# Patient Record
Sex: MALE | Race: WHITE | NOT HISPANIC OR LATINO | URBAN - METROPOLITAN AREA
[De-identification: names, ages, dates, MRNs, and addresses within clinical notes are randomized per-mention and may not be internally consistent; named-entity substitution may affect disease eponyms.]

---

## 2017-01-09 VITALS
DIASTOLIC BLOOD PRESSURE: 89 MMHG | HEART RATE: 79 BPM | SYSTOLIC BLOOD PRESSURE: 169 MMHG | OXYGEN SATURATION: 99 % | HEIGHT: 69 IN | WEIGHT: 162.04 LBS | RESPIRATION RATE: 18 BRPM

## 2017-01-09 RX ORDER — CHLORHEXIDINE GLUCONATE 213 G/1000ML
1 SOLUTION TOPICAL ONCE
Qty: 0 | Refills: 0 | Status: DISCONTINUED | OUTPATIENT
Start: 2017-01-11 | End: 2017-01-12

## 2017-01-09 NOTE — H&P ADULT. - FAMILY HISTORY
Father  Still living? Unknown  Family history of coronary artery disease, Age at diagnosis: Age Unknown

## 2017-01-09 NOTE — H&P ADULT. - ASSESSMENT
Patient is a 70yo M with FHx CAD (father CABG at 49yo) and PMHx of HTN, HLD, Non-Hodgkin's lymphoma (dx 1996 and tx'd with chemotherapy, in remission) who was found with an abnormal  ECHO and NST and is now recommended for recommended cardiac catheterization with possible intervention.    ASA II, Mallampati II Patient is a 72yo M with FHx CAD (father CABG at 51yo) and PMHx of HTN, HLD, Non-Hodgkin's lymphoma (dx 1996 and tx'd with chemotherapy, in remission) who was found with an abnormal  ECHO and NST and is now recommended for recommended cardiac catheterization with possible intervention.    ASA II, Mallampati II

## 2017-01-09 NOTE — H&P ADULT. - HISTORY OF PRESENT ILLNESS
[place]     Patient is a 70yo M with FHx CAD (father CABG at 51yo) and PMHx of HTN, HLD, Non-Hodgkin's lymphoma (dx 1996 and tx'd with chemotherapy, in remission) who was out finishing and had an episode of transient global amnesia for which he presented to [facility] in . Full workup was done at that time per patient and CT head, EEG Patient is a 70yo M with FHx CAD (father CABG at 51yo) and PMHx of HTN, HLD, Non-Hodgkin's lymphoma (dx 1996 and tx'd with chemotherapy, in remission) who was out finishing and had an episode of transient global amnesia 10/2016 (could not remember where he was or why he was there) while fishing for which he presented to [facility] in . Full workup was done at that time per patient and CT head, EEG and holter monitor were all negative. Per patient, echocardiogram was abnormal and Dr. Teague referred him for NST. NST performed on 11/17/16 revealed normal EKG, medium sized area of inferior wall myocardial infarction, normal LV function with EF 69%. Patient denies faitgue, CP, palpitations, SOB, PND, orthopnea, N/V, diaphoresis, hematochezia, melena, LE edema/ulcerations, dizziness, syncope. In light of patient's risk factors and abnormal NST, patient is now referred to [facility] for recommended cardiac catheterization with possible intervention.

## 2017-01-11 ENCOUNTER — INPATIENT (INPATIENT)
Facility: HOSPITAL | Age: 72
LOS: 0 days | Discharge: HOME | DRG: 247 | End: 2017-01-12
Attending: INTERNAL MEDICINE | Admitting: INTERNAL MEDICINE
Payer: MEDICARE

## 2017-01-11 DIAGNOSIS — Z90.89 ACQUIRED ABSENCE OF OTHER ORGANS: Chronic | ICD-10-CM

## 2017-01-11 LAB
ALBUMIN SERPL ELPH-MCNC: 4 G/DL — SIGNIFICANT CHANGE UP (ref 3.4–5)
ALP SERPL-CCNC: 83 U/L — SIGNIFICANT CHANGE UP (ref 40–120)
ALT FLD-CCNC: 37 U/L — SIGNIFICANT CHANGE UP (ref 12–42)
ANION GAP SERPL CALC-SCNC: 6 MMOL/L — LOW (ref 9–16)
APTT BLD: 30.4 SEC — SIGNIFICANT CHANGE UP (ref 27.5–37.4)
AST SERPL-CCNC: 34 U/L — SIGNIFICANT CHANGE UP (ref 15–37)
BASOPHILS NFR BLD AUTO: 0.4 % — SIGNIFICANT CHANGE UP (ref 0–2)
BILIRUB SERPL-MCNC: 0.5 MG/DL — SIGNIFICANT CHANGE UP (ref 0.2–1.2)
BUN SERPL-MCNC: 16 MG/DL — SIGNIFICANT CHANGE UP (ref 7–23)
CALCIUM SERPL-MCNC: 9.5 MG/DL — SIGNIFICANT CHANGE UP (ref 8.5–10.5)
CHLORIDE SERPL-SCNC: 104 MMOL/L — SIGNIFICANT CHANGE UP (ref 96–108)
CHOLEST SERPL-MCNC: 181 MG/DL — SIGNIFICANT CHANGE UP
CK MB BLD-MCNC: 2.5 % — SIGNIFICANT CHANGE UP
CK MB CFR SERPL CALC: 10.5 NG/ML — HIGH (ref 0.5–3.6)
CO2 SERPL-SCNC: 30 MMOL/L — SIGNIFICANT CHANGE UP (ref 22–31)
CREAT SERPL-MCNC: 1.15 MG/DL — SIGNIFICANT CHANGE UP (ref 0.5–1.3)
CRP SERPL-MCNC: <0.29 MG/DL — SIGNIFICANT CHANGE UP
EOSINOPHIL NFR BLD AUTO: 2.2 % — SIGNIFICANT CHANGE UP (ref 0–6)
ERYTHROCYTE [SEDIMENTATION RATE] IN BLOOD: 7 MM/HR — SIGNIFICANT CHANGE UP
GLUCOSE SERPL-MCNC: 112 MG/DL — HIGH (ref 70–99)
HBA1C BLD-MCNC: 5.7 % — HIGH (ref 4.8–5.6)
HCT VFR BLD CALC: 42.3 % — SIGNIFICANT CHANGE UP (ref 39–50)
HDLC SERPL-MCNC: 44 MG/DL — SIGNIFICANT CHANGE UP
HGB BLD-MCNC: 14 G/DL — SIGNIFICANT CHANGE UP (ref 13–17)
INR BLD: 1.09 — SIGNIFICANT CHANGE UP (ref 0.88–1.16)
LIPID PNL WITH DIRECT LDL SERPL: 105 MG/DL — HIGH
LYMPHOCYTES # BLD AUTO: 22.4 % — SIGNIFICANT CHANGE UP (ref 13–44)
MCHC RBC-ENTMCNC: 30.1 PG — SIGNIFICANT CHANGE UP (ref 27–34)
MCHC RBC-ENTMCNC: 33.1 G/DL — SIGNIFICANT CHANGE UP (ref 32–36)
MCV RBC AUTO: 91 FL — SIGNIFICANT CHANGE UP (ref 80–100)
MONOCYTES NFR BLD AUTO: 13.2 % — SIGNIFICANT CHANGE UP (ref 2–14)
NEUTROPHILS NFR BLD AUTO: 61.8 % — SIGNIFICANT CHANGE UP (ref 43–77)
PLATELET # BLD AUTO: 171 K/UL — SIGNIFICANT CHANGE UP (ref 150–400)
POTASSIUM SERPL-MCNC: 3.9 MMOL/L — SIGNIFICANT CHANGE UP (ref 3.5–5.3)
POTASSIUM SERPL-SCNC: 3.9 MMOL/L — SIGNIFICANT CHANGE UP (ref 3.5–5.3)
PROT SERPL-MCNC: 7.7 G/DL — SIGNIFICANT CHANGE UP (ref 6.4–8.2)
PROTHROM AB SERPL-ACNC: 12.1 SEC — SIGNIFICANT CHANGE UP (ref 10–13.1)
RBC # BLD: 4.65 M/UL — SIGNIFICANT CHANGE UP (ref 4.2–5.8)
RBC # FLD: 12.8 % — SIGNIFICANT CHANGE UP (ref 10.3–16.9)
SODIUM SERPL-SCNC: 140 MMOL/L — SIGNIFICANT CHANGE UP (ref 135–145)
TOTAL CHOLESTEROL/HDL RATIO MEASUREMENT: 4.1 RATIO — SIGNIFICANT CHANGE UP
TRIGL SERPL-MCNC: 159 MG/DL — HIGH
WBC # BLD: 7.4 K/UL — SIGNIFICANT CHANGE UP (ref 3.8–10.5)
WBC # FLD AUTO: 7.4 K/UL — SIGNIFICANT CHANGE UP (ref 3.8–10.5)

## 2017-01-11 PROCEDURE — 92928 PRQ TCAT PLMT NTRAC ST 1 LES: CPT | Mod: RC

## 2017-01-11 PROCEDURE — 93458 L HRT ARTERY/VENTRICLE ANGIO: CPT | Mod: 26,XU

## 2017-01-11 PROCEDURE — 93010 ELECTROCARDIOGRAM REPORT: CPT

## 2017-01-11 PROCEDURE — 99222 1ST HOSP IP/OBS MODERATE 55: CPT

## 2017-01-11 RX ORDER — LISINOPRIL 2.5 MG/1
40 TABLET ORAL DAILY
Qty: 0 | Refills: 0 | Status: DISCONTINUED | OUTPATIENT
Start: 2017-01-11 | End: 2017-01-12

## 2017-01-11 RX ORDER — METOPROLOL TARTRATE 50 MG
50 TABLET ORAL DAILY
Qty: 0 | Refills: 0 | Status: DISCONTINUED | OUTPATIENT
Start: 2017-01-11 | End: 2017-01-12

## 2017-01-11 RX ORDER — SIMVASTATIN 20 MG/1
20 TABLET, FILM COATED ORAL AT BEDTIME
Qty: 0 | Refills: 0 | Status: DISCONTINUED | OUTPATIENT
Start: 2017-01-11 | End: 2017-01-12

## 2017-01-11 RX ORDER — CLOPIDOGREL BISULFATE 75 MG/1
600 TABLET, FILM COATED ORAL ONCE
Qty: 0 | Refills: 0 | Status: COMPLETED | OUTPATIENT
Start: 2017-01-11 | End: 2017-01-11

## 2017-01-11 RX ORDER — SODIUM CHLORIDE 9 MG/ML
500 INJECTION INTRAMUSCULAR; INTRAVENOUS; SUBCUTANEOUS
Qty: 0 | Refills: 0 | Status: DISCONTINUED | OUTPATIENT
Start: 2017-01-11 | End: 2017-01-11

## 2017-01-11 RX ORDER — ASPIRIN/CALCIUM CARB/MAGNESIUM 324 MG
81 TABLET ORAL DAILY
Qty: 0 | Refills: 0 | Status: DISCONTINUED | OUTPATIENT
Start: 2017-01-11 | End: 2017-01-12

## 2017-01-11 RX ORDER — ASPIRIN/CALCIUM CARB/MAGNESIUM 324 MG
325 TABLET ORAL ONCE
Qty: 0 | Refills: 0 | Status: COMPLETED | OUTPATIENT
Start: 2017-01-11 | End: 2017-01-11

## 2017-01-11 RX ORDER — SODIUM CHLORIDE 9 MG/ML
500 INJECTION INTRAMUSCULAR; INTRAVENOUS; SUBCUTANEOUS
Qty: 0 | Refills: 0 | Status: DISCONTINUED | OUTPATIENT
Start: 2017-01-11 | End: 2017-01-12

## 2017-01-11 RX ORDER — CLOPIDOGREL BISULFATE 75 MG/1
75 TABLET, FILM COATED ORAL DAILY
Qty: 0 | Refills: 0 | Status: DISCONTINUED | OUTPATIENT
Start: 2017-01-12 | End: 2017-01-12

## 2017-01-11 RX ADMIN — CLOPIDOGREL BISULFATE 600 MILLIGRAM(S): 75 TABLET, FILM COATED ORAL at 10:44

## 2017-01-11 RX ADMIN — SODIUM CHLORIDE 75 MILLILITER(S): 9 INJECTION INTRAMUSCULAR; INTRAVENOUS; SUBCUTANEOUS at 10:44

## 2017-01-11 RX ADMIN — SIMVASTATIN 20 MILLIGRAM(S): 20 TABLET, FILM COATED ORAL at 21:11

## 2017-01-11 RX ADMIN — Medication 325 MILLIGRAM(S): at 10:43

## 2017-01-11 NOTE — CONSULT NOTE ADULT - SUBJECTIVE AND OBJECTIVE BOX
CHIEF COMPLAINT: CAD    HISTORY OF PRESENT ILLNESS:  Patient is a 72yo M with FHx CAD (father CABG at 49yo) and PMHx of HTN, HLD, Non-Hodgkin's lymphoma (dx 1996 and tx'd with chemotherapy, in remission) who was out finishing and had an episode of transient global amnesia 10/2016 (could not remember where he was or why he was there) while fishing for which he presented to [facility] in . Full workup was done at that time per patient and CT head, EEG and holter monitor were all negative. Per patient, echocardiogram was abnormal and Dr. Teague referred him for NST. NST performed on 11/17/16 revealed normal EKG, medium sized area of inferior wall myocardial infarction, normal LV function with EF 69%. Patient denies faitgue, CP, palpitations, SOB, PND, orthopnea, N/V, diaphoresis, hematochezia, melena, LE edema/ulcerations, dizziness, syncope. In light of patient's risk factors and abnormal NST, patient is now referred to [facility] for recommended cardiac catheterization. He had a PTCA/HUA placed to his mRCA on 1/11/17. He will return in 4-6 weeks for a staged procedure to his LAD.     PAST MEDICAL & SURGICAL HISTORY:  Transient global amnesia  Non-Hodgkin lymphoma  HLD (hyperlipidemia)  HTN (hypertension)  History of tonsillectomy    FAMILY HISTORY:   Family history of coronary artery disease (Father)    ALLERGIES: NKDA    HOME MEDICATIONS:  · 	lisinopril 40 mg oral tablet: Last Dose Taken:  , 1 tab(s) orally once a day  · 	metoprolol tartrate 50 mg oral tablet: Last Dose Taken:  , 1 tab(s) orally once a day  · 	simvastatin 20 mg oral tablet: Last Dose Taken:  , 1 tab(s) orally once a day (at bedtime)  · 	aspirin 81 mg oral tablet: Last Dose Taken:  , 1 tab(s) orally once a day        REVIEW OF SYSTEMS:  CONSTITUTIONAL: No fever, weight loss, or fatigue  NEUROLOGICAL: No headaches, memory loss, loss of strength, numbness, or tremors  PSYCHIATRIC: No depression, anxiety, mood swings, or difficulty sleeping  RESPIRATORY: No cough, wheezing, chills or hemoptysis; No Shortness of Breath  CARDIOVASCULAR: No chest pain, palpitations, passing out, dizziness, or leg swelling  GASTROINTESTINAL: No abdominal or epigastric pain. No nausea, vomiting, or hematemesis; No diarrhea or constipation. No melena or hematochezia.  SKIN: No itching, burning, rashes, or lesions   MUSCULOSKELETAL: No joint pain or swelling; No muscle, back, or extremity pain	    PHYSICAL EXAM:  T(C): 36.7, Max: 36.7 (01-11 @ 14:12)  T(F): 98.1, Max: 98.1 (01-11 @ 14:12)  HR: 84 (72 - 84)  BP: 148/80 (148/80 - 150/77)  RR: 18 (18 - 18)  SpO2: 98% (98% - 99%)  Height (cm): 175.3 (01-11 @ 09:21)  Weight (kg): 73.5 (01-11 @ 09:21)  BMI (kg/m2): 23.9 (01-11 @ 09:21)    Appearance: Normal	  Neurologic: Non-focal  Psychiatric: AxOx3, normal mood and affect  HEENT:   Normal oral mucosa, PERRL, EOMI	  Lymphatic: No lymphadenopathy  Cardiovascular: Normal S1 S2, No JVD, No murmurs, No edema  Respiratory: Lungs clear to auscultation	  Gastrointestinal:  Soft, Non-tender, + BS	  Skin: No rashes, No ecchymoses, No cyanosis	  Extremities: Normal range of motion, No clubbing, cyanosis or edema  Vascular: Peripheral pulses palpable 2+ bilaterally  	  LABS:	               14.0   7.4   )-----------( 171      ( 11 Jan 2017 09:27 )             42.3     11 Jan 2017 09:27    140    |  104    |  16     ----------------------------<  112    3.9     |  30     |  1.15     Ca    9.5        11 Jan 2017 09:27    TPro  7.7    /  Alb  4.0    /  TBili  0.5    /  DBili  x      /  AST  34     /  ALT  37     /  AlkPhos  83     11 Jan 2017 09:27      Hemoglobin A1C, Whole Blood: 5.7 % (01-11 @ 09:27)      Cholesterol, Serum: 181 mg/dL (01-11 @ 09:27)  HDL Cholesterol, Serum: 44 mg/dL (01-11 @ 09:27)  Triglycerides, Serum: 159 mg/dL (01-11 @ 09:27)  Direct LDL: 105 mg/dL (01-11 @ 09:27)    C-Reactive Protein, Serum: <0.29 mg/dL (01-11 @ 09:27)      10 "S" ASSESSMENT PLAN: SMOKING, SITTING, SUGAR, SALT, SOME FATS, SOCIAL, SLEEP, SIGNS, AND MEDS:  Tobacco usage: Remote history of cigar/pipe smoking.   Stress: Rates his stress level as low and well-controlled since being retired.   ETOH: Denies.   Caffeine: Denies.   Hormone Replacement: Denies.   Sleep Disorder: No loud snoring, wakes feeling rested.   Inflammatory Condition: Denies.   Activity Level: Swimming 1-2 times weekly.   Current Diet: Breakfast) white toast with butter and jelly and a bowl of Cheerios with whole milk. Lunch) PB&J on white bread of a cheese sandwich and a soda. Dinner) Soda with chicken or turkey and a starch and mixed vegetables. Other) cookies before bed.   Heart Failure: Denies.   Myopathy with Statins: Denies.   GI/ Issues: Denies.     ASSESSMENT/RECOMMENDATIONS: 	  Summary: Patient is a 72yo M with FHx CAD (father CABG at 49yo) and PMHx of HTN, HLD, Non-Hodgkin's lymphoma (dx 1996 and tx'd with chemotherapy, in remission) who was out finishing and had an episode of transient global amnesia 10/2016 (could not remember where he was or why he was there) while fishing for which he presented to [facility] in . Full workup was done at that time per patient and CT head, EEG and holter monitor were all negative. Per patient, echocardiogram was abnormal and Dr. Teague referred him for NST. NST performed on 11/17/16 revealed normal EKG, medium sized area of inferior wall myocardial infarction, normal LV function with EF 69%. Patient denies faitgue, CP, palpitations, SOB, PND, orthopnea, N/V, diaphoresis, hematochezia, melena, LE edema/ulcerations, dizziness, syncope. In light of patient's risk factors and abnormal NST, patient is now referred to [facility] for recommended cardiac catheterization. He had a PTCA/HUA placed to his mRCA on 1/11/17. He will return in 4-6 weeks for a staged procedure to his LAD.     RECOMMENDATIONS:   Anti-platelet Therapy: DAPT per interventionalist recommendation.   Lipid Therapy: Consider high dose statin therapy with Lipitor 40-80 mg/d or Crestor 20-40 mg/d.   Beta Blocker Therapy: Continue current therapy per your discretion.   ACE/ARB Therapy: Continue current therapy per your discretion.   Diet: Low-sodium, low-carbohydrate, Mediterranean diet. Written instruction on the Mediterranean diet was provided.   Exercise: 30-45 minutes most days of the week once cleared to do so by their referring cardiologist.   Smoking: This patient is a non-smoker.   Stress Management: Instruction on mindfulness meditation was provided.   Sleep: Clinical evidence does not support the need for a sleep study at this time.     Thank you for the opportunity to see this patient. Please feel free to contact [facility] if there are any questions, or if you feel that your patient would benefit from continued follow-up visits with the Program.

## 2017-01-11 NOTE — PROGRESS NOTE ADULT - SUBJECTIVE AND OBJECTIVE BOX
Procedure: LHC, HUA of RCA, [facility]  Indication: NSTEMI, CAD  Complication: none    Result:  1) 2 v CAD (80% mLAD, 90% mRCA)  2) Nl LVF with inferobasal hypokinesis EF 55%, LVEDP 8  3) Successful HUA of mRCA    Plan: Admit for IV hydration as GFR 60. Plavix + ASA x 1 year. Return in 4-6 weeks for PCI of LAD.  To f/u Dr. Teague.

## 2017-01-12 VITALS
OXYGEN SATURATION: 97 % | HEART RATE: 72 BPM | DIASTOLIC BLOOD PRESSURE: 81 MMHG | SYSTOLIC BLOOD PRESSURE: 144 MMHG | RESPIRATION RATE: 15 BRPM

## 2017-01-12 LAB
ANION GAP SERPL CALC-SCNC: 9 MMOL/L — SIGNIFICANT CHANGE UP (ref 9–16)
BUN SERPL-MCNC: 16 MG/DL — SIGNIFICANT CHANGE UP (ref 7–23)
CALCIUM SERPL-MCNC: 8.6 MG/DL — SIGNIFICANT CHANGE UP (ref 8.5–10.5)
CHLORIDE SERPL-SCNC: 105 MMOL/L — SIGNIFICANT CHANGE UP (ref 96–108)
CO2 SERPL-SCNC: 27 MMOL/L — SIGNIFICANT CHANGE UP (ref 22–31)
CREAT SERPL-MCNC: 0.99 MG/DL — SIGNIFICANT CHANGE UP (ref 0.5–1.3)
GLUCOSE SERPL-MCNC: 82 MG/DL — SIGNIFICANT CHANGE UP (ref 70–99)
HCT VFR BLD CALC: 39.8 % — SIGNIFICANT CHANGE UP (ref 39–50)
HGB BLD-MCNC: 12.9 G/DL — LOW (ref 13–17)
MAGNESIUM SERPL-MCNC: 2.2 MG/DL — SIGNIFICANT CHANGE UP (ref 1.6–2.4)
MCHC RBC-ENTMCNC: 29.3 PG — SIGNIFICANT CHANGE UP (ref 27–34)
MCHC RBC-ENTMCNC: 32.4 G/DL — SIGNIFICANT CHANGE UP (ref 32–36)
MCV RBC AUTO: 90.2 FL — SIGNIFICANT CHANGE UP (ref 80–100)
PLATELET # BLD AUTO: 141 K/UL — LOW (ref 150–400)
POTASSIUM SERPL-MCNC: 3.9 MMOL/L — SIGNIFICANT CHANGE UP (ref 3.5–5.3)
POTASSIUM SERPL-SCNC: 3.9 MMOL/L — SIGNIFICANT CHANGE UP (ref 3.5–5.3)
RBC # BLD: 4.41 M/UL — SIGNIFICANT CHANGE UP (ref 4.2–5.8)
RBC # FLD: 12.7 % — SIGNIFICANT CHANGE UP (ref 10.3–16.9)
SODIUM SERPL-SCNC: 141 MMOL/L — SIGNIFICANT CHANGE UP (ref 135–145)
WBC # BLD: 6.3 K/UL — SIGNIFICANT CHANGE UP (ref 3.8–10.5)
WBC # FLD AUTO: 6.3 K/UL — SIGNIFICANT CHANGE UP (ref 3.8–10.5)

## 2017-01-12 PROCEDURE — C1769: CPT

## 2017-01-12 PROCEDURE — C1725: CPT

## 2017-01-12 PROCEDURE — C1874: CPT

## 2017-01-12 PROCEDURE — 93005 ELECTROCARDIOGRAM TRACING: CPT

## 2017-01-12 PROCEDURE — 83036 HEMOGLOBIN GLYCOSYLATED A1C: CPT

## 2017-01-12 PROCEDURE — 83735 ASSAY OF MAGNESIUM: CPT

## 2017-01-12 PROCEDURE — C1894: CPT

## 2017-01-12 PROCEDURE — 36415 COLL VENOUS BLD VENIPUNCTURE: CPT

## 2017-01-12 PROCEDURE — 80048 BASIC METABOLIC PNL TOTAL CA: CPT

## 2017-01-12 PROCEDURE — C1887: CPT

## 2017-01-12 PROCEDURE — 82553 CREATINE MB FRACTION: CPT

## 2017-01-12 PROCEDURE — 85730 THROMBOPLASTIN TIME PARTIAL: CPT

## 2017-01-12 PROCEDURE — 85027 COMPLETE CBC AUTOMATED: CPT

## 2017-01-12 PROCEDURE — 82550 ASSAY OF CK (CPK): CPT

## 2017-01-12 PROCEDURE — C1889: CPT

## 2017-01-12 PROCEDURE — 80061 LIPID PANEL: CPT

## 2017-01-12 PROCEDURE — 86140 C-REACTIVE PROTEIN: CPT

## 2017-01-12 PROCEDURE — C1760: CPT

## 2017-01-12 PROCEDURE — 85652 RBC SED RATE AUTOMATED: CPT

## 2017-01-12 PROCEDURE — 85025 COMPLETE CBC W/AUTO DIFF WBC: CPT

## 2017-01-12 PROCEDURE — 85610 PROTHROMBIN TIME: CPT

## 2017-01-12 PROCEDURE — 80053 COMPREHEN METABOLIC PANEL: CPT

## 2017-01-12 RX ORDER — ASPIRIN/CALCIUM CARB/MAGNESIUM 324 MG
1 TABLET ORAL
Qty: 0 | Refills: 0 | COMMUNITY

## 2017-01-12 RX ORDER — METOPROLOL TARTRATE 50 MG
1 TABLET ORAL
Qty: 30 | Refills: 0 | OUTPATIENT
Start: 2017-01-12

## 2017-01-12 RX ORDER — ASPIRIN/CALCIUM CARB/MAGNESIUM 324 MG
1 TABLET ORAL
Qty: 30 | Refills: 0
Start: 2017-01-12

## 2017-01-12 RX ORDER — LISINOPRIL 2.5 MG/1
1 TABLET ORAL
Qty: 0 | Refills: 0 | COMMUNITY

## 2017-01-12 RX ORDER — SIMVASTATIN 20 MG/1
1 TABLET, FILM COATED ORAL
Qty: 0 | Refills: 0 | COMMUNITY

## 2017-01-12 RX ORDER — CLOPIDOGREL BISULFATE 75 MG/1
1 TABLET, FILM COATED ORAL
Qty: 30 | Refills: 0
Start: 2017-01-12

## 2017-01-12 RX ORDER — ATORVASTATIN CALCIUM 80 MG/1
1 TABLET, FILM COATED ORAL
Qty: 30 | Refills: 0
Start: 2017-01-12 | End: 2017-02-11

## 2017-01-12 RX ORDER — POTASSIUM CHLORIDE 20 MEQ
20 PACKET (EA) ORAL ONCE
Qty: 0 | Refills: 0 | Status: COMPLETED | OUTPATIENT
Start: 2017-01-12 | End: 2017-01-12

## 2017-01-12 RX ORDER — METOPROLOL TARTRATE 50 MG
1 TABLET ORAL
Qty: 0 | Refills: 0 | COMMUNITY

## 2017-01-12 RX ORDER — LISINOPRIL 2.5 MG/1
1 TABLET ORAL
Qty: 30 | Refills: 0
Start: 2017-01-12

## 2017-01-12 RX ORDER — CLOPIDOGREL BISULFATE 75 MG/1
1 TABLET, FILM COATED ORAL
Qty: 0 | Refills: 0 | COMMUNITY
Start: 2017-01-12

## 2017-01-12 RX ADMIN — Medication 20 MILLIEQUIVALENT(S): at 10:11

## 2017-01-12 RX ADMIN — Medication 50 MILLIGRAM(S): at 06:28

## 2017-01-12 RX ADMIN — LISINOPRIL 40 MILLIGRAM(S): 2.5 TABLET ORAL at 06:28

## 2017-01-12 RX ADMIN — CLOPIDOGREL BISULFATE 75 MILLIGRAM(S): 75 TABLET, FILM COATED ORAL at 10:11

## 2017-01-12 RX ADMIN — Medication 81 MILLIGRAM(S): at 10:11

## 2017-01-12 NOTE — DISCHARGE NOTE ADULT - MEDICATION SUMMARY - MEDICATIONS TO TAKE
I will START or STAY ON the medications listed below when I get home from the hospital:    aspirin 81 mg oral tablet  -- 1 tab(s) by mouth once a day  -- Indication: For Coronary artery disease    lisinopril 40 mg oral tablet  -- 1 tab(s) by mouth once a day  -- Indication: For Hypertension    atorvastatin 40 mg oral tablet  -- 1 tab(s) by mouth once a day  -- Avoid grapefruit and grapefruit juice while taking this medication.  Do not take this drug if you are pregnant.  It is very important that you take or use this exactly as directed.  Do not skip doses or discontinue unless directed by your doctor.  Obtain medical advice before taking any non-prescription drugs as some may affect the action of this medication.  Take with food or milk.    -- Indication: For Coronary artery disease    clopidogrel 75 mg oral tablet  -- 1 tab(s) by mouth once a day  -- Indication: For Coronary artery disease    metoprolol tartrate 50 mg oral tablet  -- 1 tab(s) by mouth once a day  -- Indication: For Coronary artery disease

## 2017-01-12 NOTE — DISCHARGE NOTE ADULT - PLAN OF CARE
You underwent a coronary angiogram as a result of an abnormal stress test. You were found to have two blockages in the arteries that supply blood to the heart. You had a drug eluting stent placed in the mid right coronary artery (mRCA).  Please DO NOT stop taking Aspirin or Plavix without first consulting your Cardiologist as this can result in closure of your stent. You will need to return for stenting of the left anterior descending artery within 4-6 weeks. Due to the procedure, please avoid any strenuous activity for at least one week. Prevent instent restenosis.

## 2017-01-12 NOTE — DISCHARGE NOTE ADULT - CARE PLAN
Principal Discharge DX:	Coronary artery disease  Instructions for follow-up, activity and diet:	You underwent a coronary angiogram as a result of an abnormal stress test. You were found to have two blockages in the arteries that supply blood to the heart. You had a drug eluting stent placed in the mid right coronary artery (mRCA).  Please DO NOT stop taking Aspirin or Plavix without first consulting your Cardiologist as this can result in closure of your stent. You will need to return for stenting of the left anterior descending artery within 4-6 weeks. Due to the procedure, please avoid any strenuous activity for at least one week. Principal Discharge DX:	Coronary artery disease  Goal:	Prevent instent restenosis.  Instructions for follow-up, activity and diet:	You underwent a coronary angiogram as a result of an abnormal stress test. You were found to have two blockages in the arteries that supply blood to the heart. You had a drug eluting stent placed in the mid right coronary artery (mRCA).  Please DO NOT stop taking Aspirin or Plavix without first consulting your Cardiologist as this can result in closure of your stent. You will need to return for stenting of the left anterior descending artery within 4-6 weeks. Due to the procedure, please avoid any strenuous activity for at least one week.

## 2017-01-12 NOTE — DISCHARGE NOTE ADULT - PATIENT PORTAL LINK FT
“You can access the [facility] Patient Portal, offered by [facility], by registering with the following website: [email or web address]”

## 2017-01-12 NOTE — DISCHARGE NOTE ADULT - MEDICATION SUMMARY - MEDICATIONS TO STOP TAKING
I will STOP taking the medications listed below when I get home from the hospital:    simvastatin 20 mg oral tablet  -- 1 tab(s) by mouth once a day (at bedtime)

## 2017-01-12 NOTE — DISCHARGE NOTE ADULT - CARE PROVIDER_API CALL
Yony Stanford), Cardiovascular Disease; Interventional Cardiology  [address] [place] [address]  Phone: [phone]  Fax: [phone]

## 2017-01-12 NOTE — DISCHARGE NOTE ADULT - ADDITIONAL INSTRUCTIONS
1. Please follow up with Dr. Stanford within 2 weeks of discharge.     2. You will need to return for stenting of your LAD artery on   .  The cath lab will contact you with further instructions. 1. Please follow up with Dr. Stanford within 2 weeks of discharge.   Dr. Martinez: [phone]    2. You will need to return for stenting of your LAD artery on February 21st.  The cath lab will contact you in regards to the time and further instructions.

## 2017-01-12 NOTE — DISCHARGE NOTE ADULT - HOSPITAL COURSE
70yo M with FHx CAD (father CABG at 49yo) and PMHx of HTN, HLD, Non-Hodgkin's lymphoma (dx 1996 and tx'd with chemotherapy, in remission) who was out finishing and had an episode of transient global amnesia 10/2016 (could not remember where he was or why he was there) while fishing for which he presented to [facility] in . Full workup was done at that time per patient and CT head, EEG and holter monitor were all negative. Per patient, echocardiogram was abnormal and Dr. Teague referred him for NST. NST performed on 11/17/16 revealed normal EKG, medium sized area of inferior wall myocardial infarction, normal LV function with EF 69%. Patient denies faitgue, CP, palpitations, SOB, PND, orthopnea, N/V, diaphoresis, hematochezia, melena, LE edema/ulcerations, dizziness, syncope. In light of patient's risk factors and abnormal NST, patient is now referred to [facility] for recommended cardiac catheterization with possible intervention  s/p cardiac cath 1/11: 2V CAD PTCA/HUA mRCA EF 55% inferolateral hypokinesis. Return for PCI LAD (80%) in 4-6 weeks Perclose.  Pt was admitted to [facility] overnight for monitoring and discharged home the next day hemodynamically stable. 72yo M with FHx CAD (father CABG at 51yo) and PMHx of HTN, HLD, Non-Hodgkin's lymphoma (dx 1996 and tx'd with chemotherapy, in remission) who was out finishing and had an episode of transient global amnesia 10/2016 (could not remember where he was or why he was there) while fishing for which he presented to [facility] in . Full workup was done at that time per patient and CT head, EEG and holter monitor were all negative. Per patient, echocardiogram was abnormal and Dr. Teague referred him for NST. NST performed on 11/17/16 revealed normal EKG, medium sized area of inferior wall myocardial infarction, normal LV function with EF 69%. Patient denies faitgue, CP, palpitations, SOB, PND, orthopnea, N/V, diaphoresis, hematochezia, melena, LE edema/ulcerations, dizziness, syncope. In light of patient's risk factors and abnormal NST, patient is now referred to [facility] for recommended cardiac catheterization with possible intervention  s/p cardiac cath 1/11: 2V CAD PTCA/HUA mRCA EF 55% inferolateral hypokinesis. Return for PCI LAD (80%) in 4-6 weeks [facility].  Pt was admitted to [facility] overnight for monitoring and discharged home the next day hemodynamically stable.  Pt was instructed that he will need to come back on February 21st for elective PCI of the LAD.

## 2017-01-17 DIAGNOSIS — Z82.49 FAMILY HISTORY OF ISCHEMIC HEART DISEASE AND OTHER DISEASES OF THE CIRCULATORY SYSTEM: ICD-10-CM

## 2017-01-17 DIAGNOSIS — I10 ESSENTIAL (PRIMARY) HYPERTENSION: ICD-10-CM

## 2017-01-17 DIAGNOSIS — Z85.72 PERSONAL HISTORY OF NON-HODGKIN LYMPHOMAS: ICD-10-CM

## 2017-01-17 DIAGNOSIS — E78.5 HYPERLIPIDEMIA, UNSPECIFIED: ICD-10-CM

## 2017-01-17 DIAGNOSIS — I25.118 ATHEROSCLEROTIC HEART DISEASE OF NATIVE CORONARY ARTERY WITH OTHER FORMS OF ANGINA PECTORIS: ICD-10-CM

## 2017-01-17 DIAGNOSIS — Z79.82 LONG TERM (CURRENT) USE OF ASPIRIN: ICD-10-CM

## 2017-01-17 DIAGNOSIS — I25.2 OLD MYOCARDIAL INFARCTION: ICD-10-CM

## 2017-01-17 DIAGNOSIS — R94.39 ABNORMAL RESULT OF OTHER CARDIOVASCULAR FUNCTION STUDY: ICD-10-CM

## 2017-01-17 DIAGNOSIS — Z92.21 PERSONAL HISTORY OF ANTINEOPLASTIC CHEMOTHERAPY: ICD-10-CM

## 2017-02-08 PROBLEM — I10 ESSENTIAL (PRIMARY) HYPERTENSION: Chronic | Status: ACTIVE | Noted: 2017-01-09

## 2017-02-08 PROBLEM — E78.5 HYPERLIPIDEMIA, UNSPECIFIED: Chronic | Status: ACTIVE | Noted: 2017-01-09

## 2017-02-08 PROBLEM — G45.4 TRANSIENT GLOBAL AMNESIA: Chronic | Status: ACTIVE | Noted: 2017-01-09

## 2017-02-08 PROBLEM — C85.90 NON-HODGKIN LYMPHOMA, UNSPECIFIED, UNSPECIFIED SITE: Chronic | Status: ACTIVE | Noted: 2017-01-09

## 2017-02-16 VITALS
SYSTOLIC BLOOD PRESSURE: 137 MMHG | HEIGHT: 70 IN | HEART RATE: 65 BPM | TEMPERATURE: 97 F | OXYGEN SATURATION: 100 % | DIASTOLIC BLOOD PRESSURE: 71 MMHG | WEIGHT: 160.06 LBS | RESPIRATION RATE: 16 BRPM

## 2017-02-16 RX ORDER — CHLORHEXIDINE GLUCONATE 213 G/1000ML
1 SOLUTION TOPICAL ONCE
Qty: 0 | Refills: 0 | Status: DISCONTINUED | OUTPATIENT
Start: 2017-02-21 | End: 2017-02-22

## 2017-02-16 NOTE — H&P ADULT. - ASSESSMENT
70yo M with FHx CAD (father CABG at 51yo) and PMHx of HTN, HLD, Non-Hodgkin's lymphoma (dx 1996 and tx'd with chemotherapy, in remission), Known CAD s/p HUA mid RCA @ [facility] on 01/11/17, who now returns for stage PCI of known residual mid LAD lesion.      ***OF NOTE: Pt was 72yo M with FHx CAD (father CABG at 51yo) and PMHx of HTN, HLD, Non-Hodgkin's lymphoma (dx 1996 and tx'd with chemotherapy, in remission), Known CAD s/p HUA mid RCA @ [facility] on 01/11/17, who now returns for stage PCI of known residual mid LAD lesion.      ***OF NOTE: Pt received his daily dose of ASA 325mg PO X 1 and Plavix 75mg PO X 1 prior to procedure.

## 2017-02-16 NOTE — H&P ADULT. - HISTORY OF PRESENT ILLNESS
STAGED PCI- CONFIRM SYMPTOMS SINCE LAST PROCEDURE, MEDS/ALLERGIES    70yo M with FHx CAD (father CABG at 51yo) and PMHx of HTN, HLD, Non-Hodgkin's lymphoma (dx 1996 and tx'd with chemotherapy, in remission) who was out finishing and had an episode of transient global amnesia 10/2016 (could not remember where he was or why he was there) while fishing for which he presented to [facility] in . Full workup was done at that time per patient and CT head, EEG and holter monitor were all negative. Per patient, echocardiogram was abnormal and Dr. Teague referred him for NST. NST performed on 11/17/16 revealed normal EKG, medium sized area of inferior wall myocardial infarction, normal LV function with EF 69%. Patient presented on 1/11/17 to [facility] for Cardiac Cath where he underwent successful HUA to [facility] with residual 80% mLAD lesion which he is now returning for staged PCI.      Since procedure patient-----  Compliance with meds-----    In light of patient's known residual LAD disease, he is now returning for staged PCI of mLAD lesion. STAGED PCI- CONFIRM SYMPTOMS SINCE LAST PROCEDURE, MEDS/ALLERGIES    72yo M with FHx CAD (father CABG at 49yo) and PMHx of HTN, HLD, Non-Hodgkin's lymphoma (dx 1996 and tx'd with chemotherapy, in remission) who was out fishing when he had an episode of transient global amnesia 10/2016 (could not remember where he was or why he was there) for which he presented to [facility] in . Full workup was done at that time per patient and CT head, EEG and Holter monitor were all negative. Per patient, Echo was abnormal and Dr. Teague referred him for NST. NST performed on 11/17/16 revealed normal EKG, medium sized area of inferior wall myocardial infarction, normal LV function with EF 69%. Patient presented on 1/11/17 to [facility] for Cardiac Cath where he underwent successful HUA to mRCA with residual 80% mLAD lesion which he is now returning for staged PCI.      Since procedure patient-----  Compliance with meds-----    In light of patient's known residual LAD disease, he is now returning for staged PCI of mLAD lesion.    Cardiac Cath 1/11/17 at [facility]  -LM mild luminal irregularities, mLAD 80%, LCx mild luminal irregularities, mRCA lesion(tx with HUA x 1), LVEF 55%, LVEDP 8mmHg, no AS or MR 70yo M with FHx CAD (father CABG at 49yo) and PMHx of HTN, HLD, Non-Hodgkin's lymphoma (dx 1996 and tx'd with chemotherapy, in remission) who was out fishing when he had an episode of transient global amnesia 10/2016 (could not remember where he was or why he was there) for which he presented to [facility] in . Full workup was done at that time per patient and CT head, EEG and Holter monitor were all negative. Per patient, Echo was abnormal and Dr. Teague referred him for NST. NST performed on 11/17/16 revealed normal EKG, medium sized area of inferior wall myocardial infarction, normal LV function with EF 69%. Patient presented on 1/11/17 to [facility] for Cardiac Cath where he underwent successful HUA to mRCA with residual 80% mLAD lesion which he is now returning for staged PCI.  Since procedure reports feeling good without any symptoms including CP, SOB, palpitations, PND, orthopnea, LE edema. Pt reports no change in his medication and compliance with his daily Aspirin 81mg and Plavix 75mg.  In light of patient's known residual LAD disease, he is now returning for staged PCI of mLAD lesion.    Cardiac Cath 1/11/17 at [facility]  -LM mild luminal irregularities, mLAD 80%, LCx mild luminal irregularities, mRCA lesion(tx with HUA x 1), LVEF 55%, LVEDP 8mmHg, no AS or MR 72yo M with FHx CAD (father CABG at 49yo) and PMHx of HTN, HLD, Non-Hodgkin's lymphoma (dx 1996 and tx'd with chemotherapy, in remission) who was out fishing when he had an episode of transient global amnesia 10/2016 (could not remember where he was or why he was there) for which he presented to [facility] in . Full workup was done at that time per patient and CT head, EEG and Holter monitor were all negative. Echo done 11/2017 revealed basal inferior hypokinesis, mild MR< trace TR, LVEF of 48%.  NST performed on 11/17/16 revealed normal EKG, medium sized area of inferior wall myocardial infarction, normal LV function with EF 69%. Patient presented on 1/11/17 to [facility] for Cardiac Cath where he underwent successful HUA to mRCA with residual 80% mLAD lesion which he is now returning for staged PCI.  Since procedure reports feeling good without any symptoms including CP, SOB, palpitations, PND, orthopnea, LE edema. Pt reports no change in his medication and compliance with his daily Aspirin 81mg and Plavix 75mg.  In light of patient's known residual LAD disease, he is now returning for staged PCI of mLAD lesion.    Cardiac Cath 1/11/17 at [facility]  -LM mild luminal irregularities, mLAD 80%, LCx mild luminal irregularities, mRCA lesion(tx with HUA x 1), LVEF 55%, LVEDP 8mmHg, no AS or MR

## 2017-02-21 ENCOUNTER — INPATIENT (INPATIENT)
Facility: HOSPITAL | Age: 72
LOS: 0 days | Discharge: HOME | DRG: 247 | End: 2017-02-22
Attending: INTERNAL MEDICINE | Admitting: INTERNAL MEDICINE
Payer: MEDICARE

## 2017-02-21 DIAGNOSIS — Z90.89 ACQUIRED ABSENCE OF OTHER ORGANS: Chronic | ICD-10-CM

## 2017-02-21 LAB
ALBUMIN SERPL ELPH-MCNC: 4 G/DL — SIGNIFICANT CHANGE UP (ref 3.4–5)
ALP SERPL-CCNC: 93 U/L — SIGNIFICANT CHANGE UP (ref 40–120)
ALT FLD-CCNC: 44 U/L — HIGH (ref 12–42)
ANION GAP SERPL CALC-SCNC: 3 MMOL/L — LOW (ref 9–16)
APTT BLD: 31.9 SEC — SIGNIFICANT CHANGE UP (ref 27.5–37.4)
AST SERPL-CCNC: 36 U/L — SIGNIFICANT CHANGE UP (ref 15–37)
BASOPHILS NFR BLD AUTO: 0.4 % — SIGNIFICANT CHANGE UP (ref 0–2)
BILIRUB SERPL-MCNC: 0.7 MG/DL — SIGNIFICANT CHANGE UP (ref 0.2–1.2)
BUN SERPL-MCNC: 17 MG/DL — SIGNIFICANT CHANGE UP (ref 7–23)
CALCIUM SERPL-MCNC: 9.3 MG/DL — SIGNIFICANT CHANGE UP (ref 8.5–10.5)
CHLORIDE SERPL-SCNC: 105 MMOL/L — SIGNIFICANT CHANGE UP (ref 96–108)
CHOLEST SERPL-MCNC: 138 MG/DL — SIGNIFICANT CHANGE UP
CK MB BLD-MCNC: 2.54 % — SIGNIFICANT CHANGE UP
CK MB CFR SERPL CALC: 10.5 NG/ML — HIGH (ref 0.5–3.6)
CO2 SERPL-SCNC: 33 MMOL/L — HIGH (ref 22–31)
CREAT SERPL-MCNC: 1.13 MG/DL — SIGNIFICANT CHANGE UP (ref 0.5–1.3)
CRP SERPL-MCNC: <0.29 MG/DL — SIGNIFICANT CHANGE UP
EOSINOPHIL NFR BLD AUTO: 2.6 % — SIGNIFICANT CHANGE UP (ref 0–6)
GLUCOSE SERPL-MCNC: 93 MG/DL — SIGNIFICANT CHANGE UP (ref 70–99)
HBA1C BLD-MCNC: 5.7 % — HIGH (ref 4.8–5.6)
HCT VFR BLD CALC: 38.8 % — LOW (ref 39–50)
HDLC SERPL-MCNC: 41 MG/DL — SIGNIFICANT CHANGE UP
HGB BLD-MCNC: 12.8 G/DL — LOW (ref 13–17)
INR BLD: 1.13 — SIGNIFICANT CHANGE UP (ref 0.88–1.16)
LIPID PNL WITH DIRECT LDL SERPL: 77 MG/DL — SIGNIFICANT CHANGE UP
LYMPHOCYTES # BLD AUTO: 22.5 % — SIGNIFICANT CHANGE UP (ref 13–44)
MCHC RBC-ENTMCNC: 30.3 PG — SIGNIFICANT CHANGE UP (ref 27–34)
MCHC RBC-ENTMCNC: 33 G/DL — SIGNIFICANT CHANGE UP (ref 32–36)
MCV RBC AUTO: 91.7 FL — SIGNIFICANT CHANGE UP (ref 80–100)
MONOCYTES NFR BLD AUTO: 12.3 % — SIGNIFICANT CHANGE UP (ref 2–14)
NEUTROPHILS NFR BLD AUTO: 62.2 % — SIGNIFICANT CHANGE UP (ref 43–77)
PLATELET # BLD AUTO: 138 K/UL — LOW (ref 150–400)
POTASSIUM SERPL-MCNC: 4.5 MMOL/L — SIGNIFICANT CHANGE UP (ref 3.5–5.3)
POTASSIUM SERPL-SCNC: 4.5 MMOL/L — SIGNIFICANT CHANGE UP (ref 3.5–5.3)
PROT SERPL-MCNC: 7.2 G/DL — SIGNIFICANT CHANGE UP (ref 6.4–8.2)
PROTHROM AB SERPL-ACNC: 12.6 SEC — SIGNIFICANT CHANGE UP (ref 10–13.1)
RBC # BLD: 4.23 M/UL — SIGNIFICANT CHANGE UP (ref 4.2–5.8)
RBC # FLD: 13.2 % — SIGNIFICANT CHANGE UP (ref 10.3–16.9)
SODIUM SERPL-SCNC: 141 MMOL/L — SIGNIFICANT CHANGE UP (ref 135–145)
TOTAL CHOLESTEROL/HDL RATIO MEASUREMENT: 3.4 RATIO — SIGNIFICANT CHANGE UP
TRIGL SERPL-MCNC: 100 MG/DL — SIGNIFICANT CHANGE UP
WBC # BLD: 5.1 K/UL — SIGNIFICANT CHANGE UP (ref 3.8–10.5)
WBC # FLD AUTO: 5.1 K/UL — SIGNIFICANT CHANGE UP (ref 3.8–10.5)

## 2017-02-21 PROCEDURE — 92928 PRQ TCAT PLMT NTRAC ST 1 LES: CPT | Mod: LD

## 2017-02-21 PROCEDURE — 93010 ELECTROCARDIOGRAM REPORT: CPT

## 2017-02-21 RX ORDER — SODIUM CHLORIDE 9 MG/ML
1000 INJECTION INTRAMUSCULAR; INTRAVENOUS; SUBCUTANEOUS
Qty: 0 | Refills: 0 | Status: DISCONTINUED | OUTPATIENT
Start: 2017-02-21 | End: 2017-02-22

## 2017-02-21 RX ORDER — CLOPIDOGREL BISULFATE 75 MG/1
75 TABLET, FILM COATED ORAL ONCE
Qty: 0 | Refills: 0 | Status: COMPLETED | OUTPATIENT
Start: 2017-02-21 | End: 2017-02-21

## 2017-02-21 RX ORDER — CLOPIDOGREL BISULFATE 75 MG/1
75 TABLET, FILM COATED ORAL DAILY
Qty: 0 | Refills: 0 | Status: DISCONTINUED | OUTPATIENT
Start: 2017-02-21 | End: 2017-02-22

## 2017-02-21 RX ORDER — ASPIRIN/CALCIUM CARB/MAGNESIUM 324 MG
81 TABLET ORAL DAILY
Qty: 0 | Refills: 0 | Status: DISCONTINUED | OUTPATIENT
Start: 2017-02-21 | End: 2017-02-22

## 2017-02-21 RX ORDER — ATORVASTATIN CALCIUM 80 MG/1
40 TABLET, FILM COATED ORAL AT BEDTIME
Qty: 0 | Refills: 0 | Status: DISCONTINUED | OUTPATIENT
Start: 2017-02-21 | End: 2017-02-22

## 2017-02-21 RX ORDER — SODIUM CHLORIDE 9 MG/ML
500 INJECTION INTRAMUSCULAR; INTRAVENOUS; SUBCUTANEOUS
Qty: 0 | Refills: 0 | Status: DISCONTINUED | OUTPATIENT
Start: 2017-02-21 | End: 2017-02-21

## 2017-02-21 RX ORDER — ASPIRIN/CALCIUM CARB/MAGNESIUM 324 MG
325 TABLET ORAL ONCE
Qty: 0 | Refills: 0 | Status: COMPLETED | OUTPATIENT
Start: 2017-02-21 | End: 2017-02-21

## 2017-02-21 RX ORDER — METOPROLOL TARTRATE 50 MG
50 TABLET ORAL DAILY
Qty: 0 | Refills: 0 | Status: DISCONTINUED | OUTPATIENT
Start: 2017-02-21 | End: 2017-02-22

## 2017-02-21 RX ORDER — LISINOPRIL 2.5 MG/1
40 TABLET ORAL DAILY
Qty: 0 | Refills: 0 | Status: DISCONTINUED | OUTPATIENT
Start: 2017-02-21 | End: 2017-02-22

## 2017-02-21 RX ADMIN — CLOPIDOGREL BISULFATE 75 MILLIGRAM(S): 75 TABLET, FILM COATED ORAL at 14:22

## 2017-02-21 RX ADMIN — ATORVASTATIN CALCIUM 40 MILLIGRAM(S): 80 TABLET, FILM COATED ORAL at 22:50

## 2017-02-21 RX ADMIN — SODIUM CHLORIDE 75 MILLILITER(S): 9 INJECTION INTRAMUSCULAR; INTRAVENOUS; SUBCUTANEOUS at 15:45

## 2017-02-21 RX ADMIN — Medication 325 MILLIGRAM(S): at 14:22

## 2017-02-21 RX ADMIN — SODIUM CHLORIDE 75 MILLILITER(S): 9 INJECTION INTRAMUSCULAR; INTRAVENOUS; SUBCUTANEOUS at 14:24

## 2017-02-21 NOTE — PROGRESS NOTE ADULT - SUBJECTIVE AND OBJECTIVE BOX
Procedure: HUA of mLAD  Indication: UA, CAD  Complication: none    Result:  1) Successful HUA of 80% stenosis in mLAD    Plan: Admit given ACS presentation.  Plavix + ASA x 1 year. To f/u with Dr. Teague.

## 2017-02-22 VITALS — TEMPERATURE: 97 F

## 2017-02-22 LAB
ANION GAP SERPL CALC-SCNC: 8 MMOL/L — LOW (ref 9–16)
BUN SERPL-MCNC: 20 MG/DL — SIGNIFICANT CHANGE UP (ref 7–23)
CALCIUM SERPL-MCNC: 9.2 MG/DL — SIGNIFICANT CHANGE UP (ref 8.5–10.5)
CHLORIDE SERPL-SCNC: 106 MMOL/L — SIGNIFICANT CHANGE UP (ref 96–108)
CO2 SERPL-SCNC: 28 MMOL/L — SIGNIFICANT CHANGE UP (ref 22–31)
CREAT SERPL-MCNC: 1.09 MG/DL — SIGNIFICANT CHANGE UP (ref 0.5–1.3)
GLUCOSE SERPL-MCNC: 84 MG/DL — SIGNIFICANT CHANGE UP (ref 70–99)
HCT VFR BLD CALC: 38.2 % — LOW (ref 39–50)
HGB BLD-MCNC: 12.6 G/DL — LOW (ref 13–17)
MAGNESIUM SERPL-MCNC: 2.2 MG/DL — SIGNIFICANT CHANGE UP (ref 1.6–2.4)
MCHC RBC-ENTMCNC: 30 PG — SIGNIFICANT CHANGE UP (ref 27–34)
MCHC RBC-ENTMCNC: 33 G/DL — SIGNIFICANT CHANGE UP (ref 32–36)
MCV RBC AUTO: 91 FL — SIGNIFICANT CHANGE UP (ref 80–100)
PLATELET # BLD AUTO: 135 K/UL — LOW (ref 150–400)
POTASSIUM SERPL-MCNC: 3.9 MMOL/L — SIGNIFICANT CHANGE UP (ref 3.5–5.3)
POTASSIUM SERPL-SCNC: 3.9 MMOL/L — SIGNIFICANT CHANGE UP (ref 3.5–5.3)
RBC # BLD: 4.2 M/UL — SIGNIFICANT CHANGE UP (ref 4.2–5.8)
RBC # FLD: 12.9 % — SIGNIFICANT CHANGE UP (ref 10.3–16.9)
SODIUM SERPL-SCNC: 142 MMOL/L — SIGNIFICANT CHANGE UP (ref 135–145)
WBC # BLD: 5.2 K/UL — SIGNIFICANT CHANGE UP (ref 3.8–10.5)
WBC # FLD AUTO: 5.2 K/UL — SIGNIFICANT CHANGE UP (ref 3.8–10.5)

## 2017-02-22 PROCEDURE — 82553 CREATINE MB FRACTION: CPT

## 2017-02-22 PROCEDURE — 86140 C-REACTIVE PROTEIN: CPT

## 2017-02-22 PROCEDURE — 85027 COMPLETE CBC AUTOMATED: CPT

## 2017-02-22 PROCEDURE — C1889: CPT

## 2017-02-22 PROCEDURE — C1760: CPT

## 2017-02-22 PROCEDURE — C1887: CPT

## 2017-02-22 PROCEDURE — C1894: CPT

## 2017-02-22 PROCEDURE — 36415 COLL VENOUS BLD VENIPUNCTURE: CPT

## 2017-02-22 PROCEDURE — C1874: CPT

## 2017-02-22 PROCEDURE — 83036 HEMOGLOBIN GLYCOSYLATED A1C: CPT

## 2017-02-22 PROCEDURE — 83735 ASSAY OF MAGNESIUM: CPT

## 2017-02-22 PROCEDURE — 85025 COMPLETE CBC W/AUTO DIFF WBC: CPT

## 2017-02-22 PROCEDURE — 80048 BASIC METABOLIC PNL TOTAL CA: CPT

## 2017-02-22 PROCEDURE — 99239 HOSP IP/OBS DSCHRG MGMT >30: CPT

## 2017-02-22 PROCEDURE — C1725: CPT

## 2017-02-22 PROCEDURE — 80053 COMPREHEN METABOLIC PANEL: CPT

## 2017-02-22 PROCEDURE — 82550 ASSAY OF CK (CPK): CPT

## 2017-02-22 PROCEDURE — 93005 ELECTROCARDIOGRAM TRACING: CPT

## 2017-02-22 PROCEDURE — C1769: CPT

## 2017-02-22 PROCEDURE — 85610 PROTHROMBIN TIME: CPT

## 2017-02-22 PROCEDURE — 85730 THROMBOPLASTIN TIME PARTIAL: CPT

## 2017-02-22 PROCEDURE — 80061 LIPID PANEL: CPT

## 2017-02-22 RX ORDER — METOPROLOL TARTRATE 50 MG
50 TABLET ORAL
Qty: 1500 | Refills: 2
Start: 2017-02-22 | End: 2017-05-22

## 2017-02-22 RX ADMIN — LISINOPRIL 40 MILLIGRAM(S): 2.5 TABLET ORAL at 06:34

## 2017-02-22 RX ADMIN — Medication 50 MILLIGRAM(S): at 06:34

## 2017-02-22 RX ADMIN — Medication 81 MILLIGRAM(S): at 11:09

## 2017-02-22 RX ADMIN — CLOPIDOGREL BISULFATE 75 MILLIGRAM(S): 75 TABLET, FILM COATED ORAL at 11:09

## 2017-02-22 NOTE — DISCHARGE NOTE ADULT - MEDICATION SUMMARY - MEDICATIONS TO STOP TAKING
I will STOP taking the medications listed below when I get home from the hospital:    metoprolol tartrate 50 mg oral tablet  -- 1 tab(s) by mouth once a day

## 2017-02-22 NOTE — DISCHARGE NOTE ADULT - HOSPITAL COURSE
72yo M with FHx CAD (father CABG at 51yo) and PMHx of HTN, HLD, Non-Hodgkin's lymphoma (dx 1996 and tx'd with chemotherapy, in remission) who was out fishing when he had an episode of transient global amnesia 10/2016 (could not remember where he was or why he was there) for which he presented to [facility] in . Full workup was done at that time per patient and CT head, EEG and Holter monitor were all negative. Echo done 11/2017 revealed basal inferior hypokinesis, mild MR< trace TR, LVEF of 48%.  NST performed on 11/17/16 revealed normal EKG, medium sized area of inferior wall myocardial infarction, normal LV function with EF 69%. Patient presented on 1/11/17 to [facility] for Cardiac Cath where he underwent successful HUA to mRCA with residual 80% mLAD lesion which he is now returning for staged PCI.  Since procedure reports feeling good without any symptoms including CP, SOB, palpitations, PND, orthopnea, LE edema. Pt reports no change in his medication and compliance with his daily Aspirin 81mg and Plavix 75mg.  In light of patient's known residual LAD disease, he is now returning for staged PCI of mLAD lesion. Pt underwent Cardiac Cath 2/21/17 with successful HUA x 1 to mLAD, R groin perclosed.  Patient with no complaints o/n and R groin stable no hematoma or bleeding.  EKG and labs reviewed, VSS. Pt stable for d/c as d/w Dr. Vargas this AM with f/u in Dr. Teague's office in 1-2 weeks.  Pt has all medications with multiple refills as he was he 1 month ago for initial cath. Metoprolol Tartrate changed to Metoprolol Succinate 50mg PO daily as d/w Dr. Vargas sent to patient's o/p pharmacy.  All medication and discharge ppw reviewed with patient and pt stable for d/c.  All d/c ppw signed and in chart.

## 2017-02-22 NOTE — DISCHARGE NOTE ADULT - MEDICATION SUMMARY - MEDICATIONS TO TAKE
I will START or STAY ON the medications listed below when I get home from the hospital:    aspirin 81 mg oral tablet  -- 1 tab(s) by mouth once a day  -- Indication: For Coronary Artery Disease    lisinopril 40 mg oral tablet  -- 1 tab(s) by mouth once a day  -- Indication: For Hypertension    atorvastatin 40 mg oral tablet  -- 1 tab(s) by mouth once a day  -- Avoid grapefruit and grapefruit juice while taking this medication.  Do not take this drug if you are pregnant.  It is very important that you take or use this exactly as directed.  Do not skip doses or discontinue unless directed by your doctor.  Obtain medical advice before taking any non-prescription drugs as some may affect the action of this medication.  Take with food or milk.    -- Indication: For Hyperlipidemia    clopidogrel 75 mg oral tablet  -- 1 tab(s) by mouth once a day  -- Indication: For Coronary Artery Disease    Toprol-XL 50 mg oral tablet, extended release  -- 50 milligram(s) by mouth once a day  -- Indication: For Hypertension I will START or STAY ON the medications listed below when I get home from the hospital:    aspirin 81 mg oral tablet  -- 1 tab(s) by mouth once a day  -- Indication: For CAD S/P percutaneous coronary angioplasty    lisinopril 40 mg oral tablet  -- 1 tab(s) by mouth once a day  -- Indication: For HTN (hypertension)    atorvastatin 40 mg oral tablet  -- 1 tab(s) by mouth once a day  -- Avoid grapefruit and grapefruit juice while taking this medication.  Do not take this drug if you are pregnant.  It is very important that you take or use this exactly as directed.  Do not skip doses or discontinue unless directed by your doctor.  Obtain medical advice before taking any non-prescription drugs as some may affect the action of this medication.  Take with food or milk.    -- Indication: For HLD (hyperlipidemia)    clopidogrel 75 mg oral tablet  -- 1 tab(s) by mouth once a day  -- Indication: For CAD S/P percutaneous coronary angioplasty    Toprol-XL 50 mg oral tablet, extended release  -- 50 milligram(s) by mouth once a day  -- Indication: For HTN (hypertension)

## 2017-02-22 NOTE — DISCHARGE NOTE ADULT - PLAN OF CARE
You came in for a staged procedure of a second blocked artery of your heart and had another stent placed.  You need to continue Aspirin 81mg and Plavix 75mg daily and DO NOT STOP THESE MEDICATIONS UNLESS INSTRUCTED BY YOUR CARDIOLOGIST ONLY You underwent a coronary angiogram and should wait 3 days before returning to ordinary activities. Do not drive for 2 days. Consult your doctor before returning to vigorous activity. You may return to work in 3-5 days. The catheter from your groin was removed and you should remove the dressing in 24 hours. You may shower once the dressing is removed, but avoid baths, hot tubs, or swimming for 5 days to prevent infection. If you notice bleeding from the site, hardening and pain at the site, drainage or redness from the site, coolness/paleness of the extremity, swelling, or fever, please call [phone]. Please continue your aspirin and plavix as prescribed unless otherwise indicated by your cardiologist. All of your prescriptions have been sent to the pharmacy. Please follow up with Dr. Teague in 1-2 weeks. All of your needed prescriptions have been sent electronically to your pharmacy. You have a history of elevated blood pressure and you should continue your blood pressure medications as prescribed and follow up in Dr. Teague's office in 1-2 weeks. Stop taking your Metoprolol Tartrate 50mg daily and start taking the long acting Metoprolol Succinate(ToprolXL) 50mg daily. Continue taking your Lipitor(Atorvastatin) 40mg daily at bedtime. Follow up with Dr. Teague in 1-2 weeks.

## 2017-02-22 NOTE — DISCHARGE NOTE ADULT - CARE PLAN
Principal Discharge DX:	CAD S/P percutaneous coronary angioplasty  Goal:	You came in for a staged procedure of a second blocked artery of your heart and had another stent placed.  You need to continue Aspirin 81mg and Plavix 75mg daily and DO NOT STOP THESE MEDICATIONS UNLESS INSTRUCTED BY YOUR CARDIOLOGIST ONLY  Instructions for follow-up, activity and diet:	You underwent a coronary angiogram and should wait 3 days before returning to ordinary activities. Do not drive for 2 days. Consult your doctor before returning to vigorous activity. You may return to work in 3-5 days. The catheter from your groin was removed and you should remove the dressing in 24 hours. You may shower once the dressing is removed, but avoid baths, hot tubs, or swimming for 5 days to prevent infection. If you notice bleeding from the site, hardening and pain at the site, drainage or redness from the site, coolness/paleness of the extremity, swelling, or fever, please call [phone]. Please continue your aspirin and plavix as prescribed unless otherwise indicated by your cardiologist. All of your prescriptions have been sent to the pharmacy. Please follow up with Dr. Teague in 1-2 weeks. All of your needed prescriptions have been sent electronically to your pharmacy.  Secondary Diagnosis:	HTN (hypertension)  Goal:	Stop taking your Metoprolol Tartrate 50mg daily and start taking the long acting Metoprolol Succinate(ToprolXL) 50mg daily.  Instructions for follow-up, activity and diet:	You have a history of elevated blood pressure and you should continue your blood pressure medications as prescribed and follow up in Dr. Teague's office in 1-2 weeks.  Secondary Diagnosis:	HLD (hyperlipidemia)  Goal:	Continue taking your Lipitor(Atorvastatin) 40mg daily at bedtime.  Instructions for follow-up, activity and diet:	Follow up with Dr. Teague in 1-2 weeks. Principal Discharge DX:	CAD S/P percutaneous coronary angioplasty  Goal:	You came in for a staged procedure of a second blocked artery of your heart and had another stent placed.  You need to continue Aspirin 81mg and Plavix 75mg daily and DO NOT STOP THESE MEDICATIONS UNLESS INSTRUCTED BY YOUR CARDIOLOGIST ONLY  Instructions for follow-up, activity and diet:	You underwent a coronary angiogram and should wait 3 days before returning to ordinary activities. Do not drive for 2 days. Consult your doctor before returning to vigorous activity. You may return to work in 3-5 days. The catheter from your groin was removed and you should remove the dressing in 24 hours. You may shower once the dressing is removed, but avoid baths, hot tubs, or swimming for 5 days to prevent infection. If you notice bleeding from the site, hardening and pain at the site, drainage or redness from the site, coolness/paleness of the extremity, swelling, or fever, please call [phone]. Please continue your aspirin and plavix as prescribed unless otherwise indicated by your cardiologist. All of your prescriptions have been sent to the pharmacy. Please follow up with Dr. Teague in 1-2 weeks. All of your needed prescriptions have been sent electronically to your pharmacy.  Secondary Diagnosis:	HTN (hypertension)  Goal:	Stop taking your Metoprolol Tartrate 50mg daily and start taking the long acting Metoprolol Succinate(ToprolXL) 50mg daily.  Instructions for follow-up, activity and diet:	You have a history of elevated blood pressure and you should continue your blood pressure medications as prescribed and follow up in Dr. Teague's office in 1-2 weeks.  Secondary Diagnosis:	HLD (hyperlipidemia)  Goal:	Continue taking your Lipitor(Atorvastatin) 40mg daily at bedtime.  Instructions for follow-up, activity and diet:	Follow up with Dr. Teague in 1-2 weeks. Principal Discharge DX:	CAD S/P percutaneous coronary angioplasty  Goal:	You came in for a staged procedure of a second blocked artery of your heart and had another stent placed.  You need to continue Aspirin 81mg and Plavix 75mg daily and DO NOT STOP THESE MEDICATIONS UNLESS INSTRUCTED BY YOUR CARDIOLOGIST ONLY  Instructions for follow-up, activity and diet:	You underwent a coronary angiogram and should wait 3 days before returning to ordinary activities. Do not drive for 2 days. Consult your doctor before returning to vigorous activity. You may return to work in 3-5 days. The catheter from your groin was removed and you should remove the dressing in 24 hours. You may shower once the dressing is removed, but avoid baths, hot tubs, or swimming for 5 days to prevent infection. If you notice bleeding from the site, hardening and pain at the site, drainage or redness from the site, coolness/paleness of the extremity, swelling, or fever, please call [phone]. Please continue your aspirin and plavix as prescribed unless otherwise indicated by your cardiologist. All of your prescriptions have been sent to the pharmacy. Please follow up with Dr. Teague in 1-2 weeks. All of your needed prescriptions have been sent electronically to your pharmacy.  Secondary Diagnosis:	HTN (hypertension)  Goal:	Stop taking your Metoprolol Tartrate 50mg daily and start taking the long acting Metoprolol Succinate(ToprolXL) 50mg daily.  Instructions for follow-up, activity and diet:	You have a history of elevated blood pressure and you should continue your blood pressure medications as prescribed and follow up in Dr. Teague's office in 1-2 weeks.  Secondary Diagnosis:	HLD (hyperlipidemia)  Goal:	Continue taking your Lipitor(Atorvastatin) 40mg daily at bedtime.  Instructions for follow-up, activity and diet:	Follow up with Dr. Teague in 1-2 weeks. Principal Discharge DX:	CAD S/P percutaneous coronary angioplasty  Goal:	You came in for a staged procedure of a second blocked artery of your heart and had another stent placed.  You need to continue Aspirin 81mg and Plavix 75mg daily and DO NOT STOP THESE MEDICATIONS UNLESS INSTRUCTED BY YOUR CARDIOLOGIST ONLY  Instructions for follow-up, activity and diet:	You underwent a coronary angiogram and should wait 3 days before returning to ordinary activities. Do not drive for 2 days. Consult your doctor before returning to vigorous activity. You may return to work in 3-5 days. The catheter from your groin was removed and you should remove the dressing in 24 hours. You may shower once the dressing is removed, but avoid baths, hot tubs, or swimming for 5 days to prevent infection. If you notice bleeding from the site, hardening and pain at the site, drainage or redness from the site, coolness/paleness of the extremity, swelling, or fever, please call [phone]. Please continue your aspirin and plavix as prescribed unless otherwise indicated by your cardiologist. All of your prescriptions have been sent to the pharmacy. Please follow up with Dr. Teague in 1-2 weeks. All of your needed prescriptions have been sent electronically to your pharmacy.  Secondary Diagnosis:	HTN (hypertension)  Goal:	Stop taking your Metoprolol Tartrate 50mg daily and start taking the long acting Metoprolol Succinate(ToprolXL) 50mg daily.  Instructions for follow-up, activity and diet:	You have a history of elevated blood pressure and you should continue your blood pressure medications as prescribed and follow up in Dr. Teague's office in 1-2 weeks.  Secondary Diagnosis:	HLD (hyperlipidemia)  Goal:	Continue taking your Lipitor(Atorvastatin) 40mg daily at bedtime.  Instructions for follow-up, activity and diet:	Follow up with Dr. Teague in 1-2 weeks.

## 2017-02-22 NOTE — DISCHARGE NOTE ADULT - CARE PROVIDER_API CALL
Wero Teague (DO), Cardiology; Internal Medicine  [facility] [place] [address]  Phone: [phone]  Fax: [phone]

## 2017-02-22 NOTE — DISCHARGE NOTE ADULT - PATIENT PORTAL LINK FT
“You can access the Follow[employer] Patient Portal, offered by [facility], by registering with the following website: [email or web address]”

## 2017-02-24 DIAGNOSIS — I10 ESSENTIAL (PRIMARY) HYPERTENSION: ICD-10-CM

## 2017-02-24 DIAGNOSIS — Z82.49 FAMILY HISTORY OF ISCHEMIC HEART DISEASE AND OTHER DISEASES OF THE CIRCULATORY SYSTEM: ICD-10-CM

## 2017-02-24 DIAGNOSIS — E78.5 HYPERLIPIDEMIA, UNSPECIFIED: ICD-10-CM

## 2017-02-24 DIAGNOSIS — Z95.5 PRESENCE OF CORONARY ANGIOPLASTY IMPLANT AND GRAFT: ICD-10-CM

## 2017-02-24 DIAGNOSIS — Z92.21 PERSONAL HISTORY OF ANTINEOPLASTIC CHEMOTHERAPY: ICD-10-CM

## 2017-02-24 DIAGNOSIS — I25.10 ATHEROSCLEROTIC HEART DISEASE OF NATIVE CORONARY ARTERY WITHOUT ANGINA PECTORIS: ICD-10-CM

## 2017-02-24 DIAGNOSIS — Z79.02 LONG TERM (CURRENT) USE OF ANTITHROMBOTICS/ANTIPLATELETS: ICD-10-CM

## 2017-02-24 DIAGNOSIS — Z79.82 LONG TERM (CURRENT) USE OF ASPIRIN: ICD-10-CM

## 2017-02-24 DIAGNOSIS — Z85.72 PERSONAL HISTORY OF NON-HODGKIN LYMPHOMAS: ICD-10-CM

## 2018-06-04 ENCOUNTER — RECORD ABSTRACTING (OUTPATIENT)
Age: 73
End: 2018-06-04

## 2018-06-04 DIAGNOSIS — Z86.73 PERSONAL HISTORY OF TRANSIENT ISCHEMIC ATTACK (TIA), AND CEREBRAL INFARCTION W/OUT RESIDUAL DEFICITS: ICD-10-CM

## 2018-06-04 DIAGNOSIS — Z78.9 OTHER SPECIFIED HEALTH STATUS: ICD-10-CM

## 2018-06-04 DIAGNOSIS — Z87.898 PERSONAL HISTORY OF OTHER SPECIFIED CONDITIONS: ICD-10-CM

## 2018-06-04 DIAGNOSIS — Z82.49 FAMILY HISTORY OF ISCHEMIC HEART DISEASE AND OTHER DISEASES OF THE CIRCULATORY SYSTEM: ICD-10-CM

## 2018-06-04 DIAGNOSIS — C85.90 NON-HODGKIN LYMPHOMA, UNSPECIFIED, UNSPECIFIED SITE: ICD-10-CM

## 2018-06-04 RX ORDER — ASPIRIN 81 MG/1
81 TABLET, CHEWABLE ORAL DAILY
Refills: 0 | Status: ACTIVE | COMMUNITY

## 2018-06-04 RX ORDER — ATORVASTATIN CALCIUM 40 MG/1
40 TABLET, FILM COATED ORAL DAILY
Refills: 0 | Status: ACTIVE | COMMUNITY

## 2018-06-21 ENCOUNTER — APPOINTMENT (OUTPATIENT)
Dept: HEART AND VASCULAR | Facility: CLINIC | Age: 73
End: 2018-06-21
Payer: MEDICARE

## 2018-06-21 VITALS
HEART RATE: 70 BPM | RESPIRATION RATE: 12 BRPM | WEIGHT: 160 LBS | BODY MASS INDEX: 25.71 KG/M2 | DIASTOLIC BLOOD PRESSURE: 82 MMHG | HEIGHT: 66 IN | SYSTOLIC BLOOD PRESSURE: 154 MMHG

## 2018-06-21 PROCEDURE — 93000 ELECTROCARDIOGRAM COMPLETE: CPT

## 2018-06-21 PROCEDURE — 99213 OFFICE O/P EST LOW 20 MIN: CPT | Mod: 25

## 2018-06-21 RX ORDER — CLOPIDOGREL 75 MG/1
75 TABLET, FILM COATED ORAL DAILY
Refills: 0 | Status: DISCONTINUED | COMMUNITY
End: 2018-06-21

## 2018-06-21 RX ORDER — AMOXICILLIN 500 MG/1
500 CAPSULE ORAL
Qty: 30 | Refills: 0 | Status: DISCONTINUED | COMMUNITY
Start: 2018-05-16

## 2018-06-21 RX ORDER — HYDROCODONE BITARTRATE AND ACETAMINOPHEN 5; 325 MG/1; MG/1
5-325 TABLET ORAL
Qty: 16 | Refills: 0 | Status: DISCONTINUED | COMMUNITY
Start: 2018-02-22

## 2018-06-21 RX ORDER — CLINDAMYCIN HYDROCHLORIDE 150 MG/1
150 CAPSULE ORAL
Qty: 40 | Refills: 0 | Status: DISCONTINUED | COMMUNITY
Start: 2018-05-24

## 2018-06-21 RX ORDER — ACETAMINOPHEN AND CODEINE 300; 30 MG/1; MG/1
300-30 TABLET ORAL
Qty: 16 | Refills: 0 | Status: DISCONTINUED | COMMUNITY
Start: 2018-05-16

## 2018-09-12 ENCOUNTER — APPOINTMENT (OUTPATIENT)
Dept: HEART AND VASCULAR | Facility: CLINIC | Age: 73
End: 2018-09-12
Payer: MEDICARE

## 2018-09-12 VITALS
BODY MASS INDEX: 26.03 KG/M2 | HEIGHT: 66 IN | DIASTOLIC BLOOD PRESSURE: 90 MMHG | HEART RATE: 68 BPM | WEIGHT: 162 LBS | RESPIRATION RATE: 12 BRPM | SYSTOLIC BLOOD PRESSURE: 160 MMHG

## 2018-09-12 DIAGNOSIS — Z00.00 ENCOUNTER FOR GENERAL ADULT MEDICAL EXAMINATION W/OUT ABNORMAL FINDINGS: ICD-10-CM

## 2018-09-12 PROCEDURE — 93000 ELECTROCARDIOGRAM COMPLETE: CPT

## 2018-09-12 PROCEDURE — 99214 OFFICE O/P EST MOD 30 MIN: CPT | Mod: 25

## 2019-03-13 ENCOUNTER — APPOINTMENT (OUTPATIENT)
Dept: HEART AND VASCULAR | Facility: CLINIC | Age: 74
End: 2019-03-13
Payer: MEDICARE

## 2019-03-13 VITALS
SYSTOLIC BLOOD PRESSURE: 156 MMHG | DIASTOLIC BLOOD PRESSURE: 90 MMHG | RESPIRATION RATE: 12 BRPM | HEART RATE: 70 BPM | BODY MASS INDEX: 26.2 KG/M2 | HEIGHT: 66 IN | WEIGHT: 163 LBS

## 2019-03-13 PROCEDURE — 93000 ELECTROCARDIOGRAM COMPLETE: CPT

## 2019-03-13 PROCEDURE — 99214 OFFICE O/P EST MOD 30 MIN: CPT

## 2019-03-13 NOTE — HISTORY OF PRESENT ILLNESS
[FreeTextEntry1] : 73-year-old male with a past medical history of CAD status post PCI comes in for routine followup. Patient denies any chest pain shortness of breath PND orthopnea.

## 2019-03-13 NOTE — PHYSICAL EXAM
[General Appearance - Well Developed] : well developed [Normal Appearance] : normal appearance [Well Groomed] : well groomed [General Appearance - Well Nourished] : well nourished [No Deformities] : no deformities [General Appearance - In No Acute Distress] : no acute distress [Normal Oral Mucosa] : normal oral mucosa [No Oral Pallor] : no oral pallor [No Oral Cyanosis] : no oral cyanosis [Normal Jugular Venous A Waves Present] : normal jugular venous A waves present [Normal Jugular Venous V Waves Present] : normal jugular venous V waves present [No Jugular Venous Singh A Waves] : no jugular venous singh A waves [Respiration, Rhythm And Depth] : normal respiratory rhythm and effort [Exaggerated Use Of Accessory Muscles For Inspiration] : no accessory muscle use [Auscultation Breath Sounds / Voice Sounds] : lungs were clear to auscultation bilaterally [Heart Rate And Rhythm] : heart rate and rhythm were normal [Heart Sounds] : normal S1 and S2 [Arterial Pulses Normal] : the arterial pulses were normal [Edema] : no peripheral edema present [Abdomen Soft] : soft [Abdomen Tenderness] : non-tender [Abdomen Mass (___ Cm)] : no abdominal mass palpated [Abnormal Walk] : normal gait [Gait - Sufficient For Exercise Testing] : the gait was sufficient for exercise testing [Nail Clubbing] : no clubbing of the fingernails [Cyanosis, Localized] : no localized cyanosis [Petechial Hemorrhages (___cm)] : no petechial hemorrhages [] : no ischemic changes [Skin Color & Pigmentation] : normal skin color and pigmentation [Oriented To Time, Place, And Person] : oriented to person, place, and time [Affect] : the affect was normal [Mood] : the mood was normal [No Anxiety] : not feeling anxious

## 2019-03-13 NOTE — DISCUSSION/SUMMARY
[___ Month(s)] : [unfilled] month(s) [With Me] : with me [FreeTextEntry1] : 1. CAD: Continue aspirin Toprol will obtain an EKG. And an echo\par 2. Hypertension: Continue lisinopril 40 mg maintain a low salt diet patient does admit to white coat hypertension blood pressure typically is 140/80 by the PCP.\par 3. Hyperlipidemia: Continue Lipitor 40mg maintain a low-fat diet\par 4. Carotid atherosclerosis: Carotid duplex

## 2019-03-20 ENCOUNTER — APPOINTMENT (OUTPATIENT)
Dept: HEART AND VASCULAR | Facility: CLINIC | Age: 74
End: 2019-03-20
Payer: MEDICARE

## 2019-03-20 PROCEDURE — 93306 TTE W/DOPPLER COMPLETE: CPT

## 2019-03-20 PROCEDURE — 93880 EXTRACRANIAL BILAT STUDY: CPT

## 2019-09-18 ENCOUNTER — APPOINTMENT (OUTPATIENT)
Dept: HEART AND VASCULAR | Facility: CLINIC | Age: 74
End: 2019-09-18
Payer: MEDICARE

## 2019-09-18 ENCOUNTER — NON-APPOINTMENT (OUTPATIENT)
Age: 74
End: 2019-09-18

## 2019-09-18 VITALS
HEART RATE: 74 BPM | RESPIRATION RATE: 10 BRPM | DIASTOLIC BLOOD PRESSURE: 88 MMHG | WEIGHT: 161 LBS | HEIGHT: 66 IN | SYSTOLIC BLOOD PRESSURE: 156 MMHG | BODY MASS INDEX: 25.88 KG/M2

## 2019-09-18 PROCEDURE — 99214 OFFICE O/P EST MOD 30 MIN: CPT

## 2019-09-18 PROCEDURE — 93000 ELECTROCARDIOGRAM COMPLETE: CPT

## 2019-09-18 NOTE — PHYSICAL EXAM
[General Appearance - Well Developed] : well developed [Normal Appearance] : normal appearance [Well Groomed] : well groomed [General Appearance - Well Nourished] : well nourished [No Deformities] : no deformities [General Appearance - In No Acute Distress] : no acute distress [Normal Oral Mucosa] : normal oral mucosa [No Oral Pallor] : no oral pallor [No Oral Cyanosis] : no oral cyanosis [Normal Jugular Venous A Waves Present] : normal jugular venous A waves present [No Jugular Venous Isngh A Waves] : no jugular venous singh A waves [Normal Jugular Venous V Waves Present] : normal jugular venous V waves present [Respiration, Rhythm And Depth] : normal respiratory rhythm and effort [Exaggerated Use Of Accessory Muscles For Inspiration] : no accessory muscle use [Auscultation Breath Sounds / Voice Sounds] : lungs were clear to auscultation bilaterally [Heart Sounds] : normal S1 and S2 [Heart Rate And Rhythm] : heart rate and rhythm were normal [Arterial Pulses Normal] : the arterial pulses were normal [Edema] : no peripheral edema present [Abdomen Soft] : soft [Abdomen Tenderness] : non-tender [Abdomen Mass (___ Cm)] : no abdominal mass palpated [Abnormal Walk] : normal gait [Gait - Sufficient For Exercise Testing] : the gait was sufficient for exercise testing [Nail Clubbing] : no clubbing of the fingernails [Petechial Hemorrhages (___cm)] : no petechial hemorrhages [Cyanosis, Localized] : no localized cyanosis [Oriented To Time, Place, And Person] : oriented to person, place, and time [] : no ischemic changes [Affect] : the affect was normal [Mood] : the mood was normal [No Anxiety] : not feeling anxious

## 2019-09-18 NOTE — DISCUSSION/SUMMARY
[___ Month(s)] : [unfilled] month(s) [With Me] : with me [FreeTextEntry1] : 1. CAD: Continue aspirin 81 mg daily Toprol 50 mg daily. EKG.\par 2. Hypertension: For now continue lisinopril 40 mg daily patient follows regularly with PCP and reports blood pressure in the PCPs office as always within normal limits considering the patient's his PCP far more often than myself will defer to his PCP for blood pressure management. Maintain a low-salt diet.\par 3. Hyperlipidemia: Continue atorvastatin 40 mg daily to follow up with PCP for fasting lipid panel.

## 2019-09-18 NOTE — HISTORY OF PRESENT ILLNESS
[FreeTextEntry1] : 73-year-old male with a past medical history of CAD status post PCI, hypertension hyperlipidemia comes in for routine followup. Patient denies any chest pain shortness of breath PND or orthopnea.

## 2020-05-06 ENCOUNTER — NON-APPOINTMENT (OUTPATIENT)
Age: 75
End: 2020-05-06

## 2020-05-06 ENCOUNTER — APPOINTMENT (OUTPATIENT)
Dept: HEART AND VASCULAR | Facility: CLINIC | Age: 75
End: 2020-05-06
Payer: MEDICARE

## 2020-05-06 VITALS
DIASTOLIC BLOOD PRESSURE: 80 MMHG | SYSTOLIC BLOOD PRESSURE: 150 MMHG | RESPIRATION RATE: 12 BRPM | WEIGHT: 161 LBS | HEIGHT: 66 IN | HEART RATE: 82 BPM | BODY MASS INDEX: 25.88 KG/M2

## 2020-05-06 PROCEDURE — 93306 TTE W/DOPPLER COMPLETE: CPT

## 2020-05-06 PROCEDURE — 99214 OFFICE O/P EST MOD 30 MIN: CPT

## 2020-05-06 RX ORDER — METOPROLOL SUCCINATE 50 MG/1
50 TABLET, EXTENDED RELEASE ORAL DAILY
Refills: 0 | Status: DISCONTINUED | COMMUNITY
End: 2020-05-06

## 2020-05-06 NOTE — PHYSICAL EXAM
[General Appearance - Well Developed] : well developed [Normal Appearance] : normal appearance [Well Groomed] : well groomed [General Appearance - Well Nourished] : well nourished [No Deformities] : no deformities [General Appearance - In No Acute Distress] : no acute distress [Normal Oral Mucosa] : normal oral mucosa [No Oral Pallor] : no oral pallor [No Oral Cyanosis] : no oral cyanosis [Normal Jugular Venous A Waves Present] : normal jugular venous A waves present [Normal Jugular Venous V Waves Present] : normal jugular venous V waves present [No Jugular Venous Singh A Waves] : no jugular venous singh A waves [Respiration, Rhythm And Depth] : normal respiratory rhythm and effort [Exaggerated Use Of Accessory Muscles For Inspiration] : no accessory muscle use [Auscultation Breath Sounds / Voice Sounds] : lungs were clear to auscultation bilaterally [5th Left ICS - MCL] : palpated at the 5th LICS in the midclavicular line [Normal] : normal [Normal Rate] : normal [Rhythm Regular] : regular [Normal S1] : normal S1 [Normal S2] : normal S2 [III] : a grade 3 [Right Carotid Bruit] : right carotid bruit heard [Left Carotid Bruit] : left carotid bruit heard [No Pitting Edema] : no pitting edema present [Abdomen Soft] : soft [Abdomen Tenderness] : non-tender [Abdomen Mass (___ Cm)] : no abdominal mass palpated [Abnormal Walk] : normal gait [Gait - Sufficient For Exercise Testing] : the gait was sufficient for exercise testing [Nail Clubbing] : no clubbing of the fingernails [Cyanosis, Localized] : no localized cyanosis [Petechial Hemorrhages (___cm)] : no petechial hemorrhages [] : no ischemic changes [Oriented To Time, Place, And Person] : oriented to person, place, and time [Affect] : the affect was normal [Mood] : the mood was normal [No Anxiety] : not feeling anxious

## 2020-05-06 NOTE — HISTORY OF PRESENT ILLNESS
[FreeTextEntry1] : 74-year-old male with a past medical history of CAD status post PCI, hypertension hyperlipidemia comes in for routine followup. Patient recently spoke with PCP and adjustment of medication done due to low normal blood pressure originally started hydrochlorothiazide and the blood pressure dropped too low PCP made the following adjustments, metoprolol every other day and depending on heart rate and blood pressure either 50 mg or 25 mg. Patient's last dose of metoprolol was yesterday. Denies any headaches dizziness shortness of breath PND or orthopnea.

## 2020-05-06 NOTE — DISCUSSION/SUMMARY
[FreeTextEntry1] : 1. CAD: Will discontinue Toprol and switch for carvedilol 3.125 mg twice daily this will decrease blood pressure and provide 24 hour blood pressure heart rate coverage above and beyond Toprol. EKG. Continue aspirin. surveillance stress testing \par 2. Hypertension: Continue lisinopril and hydrochlorothiazide, monitor blood pressure twice daily keep a BP log. Maintain a low-salt diet.\par 3. Hyperlipidemia: Continue atorvastatin\par 4. Mitral insufficiency: Echocardiogram\par 5. Carotid bruit: Carotid duplex

## 2020-05-20 ENCOUNTER — APPOINTMENT (OUTPATIENT)
Dept: HEART AND VASCULAR | Facility: CLINIC | Age: 75
End: 2020-05-20
Payer: MEDICARE

## 2020-05-20 PROCEDURE — 99442: CPT

## 2020-06-03 ENCOUNTER — APPOINTMENT (OUTPATIENT)
Dept: HEART AND VASCULAR | Facility: CLINIC | Age: 75
End: 2020-06-03

## 2020-06-22 ENCOUNTER — APPOINTMENT (OUTPATIENT)
Dept: HEART AND VASCULAR | Facility: CLINIC | Age: 75
End: 2020-06-22
Payer: MEDICARE

## 2020-06-22 VITALS
SYSTOLIC BLOOD PRESSURE: 150 MMHG | WEIGHT: 161 LBS | DIASTOLIC BLOOD PRESSURE: 70 MMHG | HEIGHT: 66 IN | HEART RATE: 101 BPM | BODY MASS INDEX: 25.88 KG/M2

## 2020-06-22 PROCEDURE — 93015 CV STRESS TEST SUPVJ I&R: CPT

## 2020-06-22 NOTE — PHYSICAL EXAM
[Normal Appearance] : normal appearance [General Appearance - Well Developed] : well developed [Well Groomed] : well groomed [No Deformities] : no deformities [General Appearance - Well Nourished] : well nourished [General Appearance - In No Acute Distress] : no acute distress [Normal Conjunctiva] : the conjunctiva exhibited no abnormalities [No Oral Pallor] : no oral pallor [Normal Oral Mucosa] : normal oral mucosa [Eyelids - No Xanthelasma] : the eyelids demonstrated no xanthelasmas [No Oral Cyanosis] : no oral cyanosis [Normal Jugular Venous A Waves Present] : normal jugular venous A waves present [No Jugular Venous Singh A Waves] : no jugular venous singh A waves [] : no respiratory distress [Normal Jugular Venous V Waves Present] : normal jugular venous V waves present [Auscultation Breath Sounds / Voice Sounds] : lungs were clear to auscultation bilaterally [Respiration, Rhythm And Depth] : normal respiratory rhythm and effort [Exaggerated Use Of Accessory Muscles For Inspiration] : no accessory muscle use [Heart Rate And Rhythm] : heart rate and rhythm were normal [Heart Sounds] : normal S1 and S2 [Murmurs] : no murmurs present

## 2020-07-01 ENCOUNTER — APPOINTMENT (OUTPATIENT)
Dept: HEART AND VASCULAR | Facility: CLINIC | Age: 75
End: 2020-07-01

## 2020-07-07 ENCOUNTER — APPOINTMENT (OUTPATIENT)
Dept: HEART AND VASCULAR | Facility: CLINIC | Age: 75
End: 2020-07-07
Payer: MEDICARE

## 2020-07-07 VITALS
WEIGHT: 160 LBS | DIASTOLIC BLOOD PRESSURE: 80 MMHG | HEART RATE: 80 BPM | HEIGHT: 66 IN | SYSTOLIC BLOOD PRESSURE: 150 MMHG | BODY MASS INDEX: 25.71 KG/M2

## 2020-07-07 PROCEDURE — 96374 THER/PROPH/DIAG INJ IV PUSH: CPT | Mod: 59

## 2020-07-07 PROCEDURE — 78452 HT MUSCLE IMAGE SPECT MULT: CPT

## 2020-07-07 PROCEDURE — A9500: CPT

## 2020-07-07 PROCEDURE — 93015 CV STRESS TEST SUPVJ I&R: CPT

## 2020-07-07 NOTE — PHYSICAL EXAM
[General Appearance - Well Developed] : well developed [Normal Appearance] : normal appearance [Well Groomed] : well groomed [General Appearance - Well Nourished] : well nourished [No Deformities] : no deformities [General Appearance - In No Acute Distress] : no acute distress [Normal Conjunctiva] : the conjunctiva exhibited no abnormalities [No Oral Pallor] : no oral pallor [Normal Oral Mucosa] : normal oral mucosa [Eyelids - No Xanthelasma] : the eyelids demonstrated no xanthelasmas [No Oral Cyanosis] : no oral cyanosis [Normal Jugular Venous A Waves Present] : normal jugular venous A waves present [Normal Jugular Venous V Waves Present] : normal jugular venous V waves present [No Jugular Venous Singh A Waves] : no jugular venous singh A waves [Respiration, Rhythm And Depth] : normal respiratory rhythm and effort [] : no respiratory distress [Exaggerated Use Of Accessory Muscles For Inspiration] : no accessory muscle use [Heart Sounds] : normal S1 and S2 [Heart Rate And Rhythm] : heart rate and rhythm were normal [Auscultation Breath Sounds / Voice Sounds] : lungs were clear to auscultation bilaterally [Murmurs] : no murmurs present

## 2020-07-20 VITALS
HEIGHT: 69 IN | HEART RATE: 75 BPM | TEMPERATURE: 97 F | SYSTOLIC BLOOD PRESSURE: 166 MMHG | RESPIRATION RATE: 16 BRPM | OXYGEN SATURATION: 99 % | DIASTOLIC BLOOD PRESSURE: 76 MMHG | WEIGHT: 160.06 LBS

## 2020-07-20 RX ORDER — CHLORHEXIDINE GLUCONATE 213 G/1000ML
1 SOLUTION TOPICAL ONCE
Refills: 0 | Status: DISCONTINUED | OUTPATIENT
Start: 2020-07-23 | End: 2020-08-07

## 2020-07-20 NOTE — H&P ADULT - NSHPLABSRESULTS_GEN_ALL_CORE
CBC Full  -  ( 23 Jul 2020 10:43 )  WBC Count : 6.50 K/uL  RBC Count : 4.32 M/uL  Hemoglobin : 12.9 g/dL  Hematocrit : 40.8 %  Platelet Count - Automated : 151 K/uL  Mean Cell Volume : 94.4 fl  Mean Cell Hemoglobin : 29.9 pg  Mean Cell Hemoglobin Concentration : 31.6 gm/dL  Auto Neutrophil # : 4.39 K/uL  Auto Lymphocyte # : 1.22 K/uL  Auto Monocyte # : 0.76 K/uL  Auto Eosinophil # : 0.10 K/uL  Auto Basophil # : 0.02 K/uL  Auto Neutrophil % : 67.5 %  Auto Lymphocyte % : 18.8 %  Auto Monocyte % : 11.7 %  Auto Eosinophil % : 1.5 %  Auto Basophil % : 0.3 %    07-23    139  |  99  |  16  ----------------------------<  104<H>  4.1   |  29  |  1.26    Ca    9.9      23 Jul 2020 10:43    TPro  6.8  /  Alb  4.4  /  TBili  1.0  /  DBili  x   /  AST  37  /  ALT  27  /  AlkPhos  71  07-23

## 2020-07-20 NOTE — H&P ADULT - ASSESSMENT
73 y/o M with FHx CAD (father CABG at 51yo) and PMHx of HTN, HLD, Non-Hodgkin's lymphoma (dx 1996 and tx'd with chemotherapy, in remission), Transient Global Amnesia (episode in 10/2016; [facility] w/ (-) CT head, EEG, and holter; no recent episodes, CAD (PCI mLAD and mRCA in 2017), who presents for cardiac catheterization with possible intervention if clinically indicated in light of risk factors, history of CAD, and abnormal NST results.     EKG upon arrival revealed sinus rhythm w/ 1st degree AV block, MO interval 210, and w/o ST-T wave ischemic changes. Labs significant for CKMB 9.7 and Creatine Kinase 467, but otherwise within normal limits. Patient reports he did not take his daily medicine of Aspirin 81 mg on 07/23/2020 AM. Patient was ordered for Aspirin 81 mg once as well as Plavix 600 mg once. Patient was also ordered for NS 75 cc/hour x 4 hours.   					  ASA: III				Mallampati class: II	            Anginal Class: Asymptomatic    Sedation Plan: Moderate      Patient Is Suitable Candidate For Sedation: Yes     Risks & benefits of procedure and sedation and risks and benefits for the alternative therapy have been explained to the patient in layman’s terms including but not limited to: allergic reaction, bleeding, infection, arrhythmia, respiratory compromise, renal and vascular compromise, limb damage, MI, CVA, emergent CABG/Vascular Surgery and death. Informed consent obtained and in chart.

## 2020-07-20 NOTE — H&P ADULT - NSICDXPASTMEDICALHX_GEN_ALL_CORE_FT
PAST MEDICAL HISTORY:  CAD (coronary artery disease)     HLD (hyperlipidemia)     HTN (hypertension)     Non-Hodgkin lymphoma     Transient global amnesia

## 2020-07-20 NOTE — H&P ADULT - NSICDXFAMILYHX_GEN_ALL_CORE_FT
FAMILY HISTORY:  Father  Still living? Unknown  Family history of coronary artery disease, Age at diagnosis: Age Unknown

## 2020-07-20 NOTE — H&P ADULT - HISTORY OF PRESENT ILLNESS
[place]   COVID:   Pharmacy:   Escort:    74 M with FHx CAD (father CABG at 49yo) and PMHx of HTN, HLD, Non-Hodgkin's lymphoma (dx 1996 and tx'd with chemotherapy, in remission) who was out fishing when he had an episode of transient global amnesia 10/2016 ([facility] with negative CT head, EEG and holter) CAD with PCI mLad and mRca (last cath 2/2017), who presented to his cardiologist Dr Teague for routine follow up.   Pt endores…  Pt denies CP, SOB, orthopnea, PND, N/V/D, dizziness, syncope, diaphoresis, palpitations, F/C, LE edema, cough, melena or hematuria.  NST 7/7/20: abnormal with small sized area of basal inferior and basal inferoseptal wall infarction (mod intensity), normal LV function with mild hypokinsis of basal inferior and basal inferior septum noted. The area of infarction is new finding compared to 2/28/18.  EF 74    In light of risk factors, CCS angina class ____ symptoms, abnormal NST pt is recommended for cardiac catheterization with possible intervention if clinically indicated    Cardiac cath @ [facility] 2/21/17: HUA mLAD  Cardaic cath @ [facility] 1/11/17: HUA to mRCA with residual 80% mLAD lesion [place]   COVID: 7/21 @[address]   Pharmacy:   Escort:    74 M with FHx CAD (father CABG at 49yo) and PMHx of HTN, HLD, Non-Hodgkin's lymphoma (dx 1996 and tx'd with chemotherapy, in remission) who was out fishing when he had an episode of transient global amnesia 10/2016 ([facility] with negative CT head, EEG and holter) CAD with PCI mLad and mRca (last cath 2/2017), who presented to his cardiologist Dr Teague for routine follow up.   Pt endores…  Pt denies CP, SOB, orthopnea, PND, N/V/D, dizziness, syncope, diaphoresis, palpitations, F/C, LE edema, cough, melena or hematuria.  NST 7/7/20: abnormal with small sized area of basal inferior and basal inferoseptal wall infarction (mod intensity), normal LV function with mild hypokinsis of basal inferior and basal inferior septum noted. The area of infarction is new finding compared to 2/28/18.  EF 74    In light of risk factors, CCS angina class ____ symptoms, abnormal NST pt is recommended for cardiac catheterization with possible intervention if clinically indicated    Cardiac cath @ [facility] 2/21/17: HUA mLAD  Cardaic cath @ [facility] 1/11/17: HUA to mRCA with residual 80% mLAD lesion COVID Swab: 7/21 @[address] - PENDING  Pharmacy: [facility] [phone]  Escort: [facility] (pt reports he would be able to be an early discharge day after procedure if he is a candidate)    74 M with FHx CAD (father CABG at 51yo) and PMHx of HTN, HLD, Non-Hodgkin's lymphoma (dx 1996 and tx'd with chemotherapy, in remission), Transient Global Amnesia (episode in 10/2016; [facility] w/ (-) CT head, EEG, and holter; no recent episodes, CAD (PCI mLAD and mRCA in 2017), who presented to his cardiologist, Dr. Teague, for routine follow up. Pt reports that he is asymptomatic, and denies CP, SOB, orthopnea, PND, N/V/D, dizziness, syncope, diaphoresis, palpitations, fever, chills, LE edema, melena, hematuria. Pt’s cardiologist sent him for a stress test, which was abnormal. NST 7/7/20: abnormal with small sized area of basal inferior and basal inferoseptal wall infarction (mod intensity), normal LV function with mild hypokinsis of basal inferior and basal inferior septum noted, EF 74%, the area of infarction is new finding compared to 2/28/18.  EF 74    In light of risk factors, history of CAD, and abnormal NST pt presents for cardiac catheterization with intervention if clinically indicated.       Cardiac cath @ [facility] 2/21/17: HUA mLAD  Cardaic cath @ [facility] 1/11/17: HUA to mRCA with residual 80% mLAD lesion COVID Swab: 7/21 @[address] - PENDING  Pharmacy: [facility] [phone]  Escort: [facility] (pt reports he would be able to be an early discharge day after procedure if he is a candidate)    73 y/o M with FHx CAD (father CABG at 51yo) and PMHx of HTN, HLD, Non-Hodgkin's lymphoma (dx 1996 and tx'd with chemotherapy, in remission), Transient Global Amnesia (episode in 10/2016; [facility] w/ (-) CT head, EEG, and holter; no recent episodes, CAD (PCI mLAD and mRCA in 2017), who presented to his cardiologist, Dr. Teague, for routine follow up. Pt reports that he is asymptomatic, and denies CP, SOB, orthopnea, PND, N/V/D, dizziness, syncope, diaphoresis, palpitations, fever, chills, LE edema, melena, hematuria. Pt’s cardiologist sent him for a stress test, which was abnormal. NST 7/7/20: abnormal with small sized area of basal inferior and basal inferoseptal wall infarction (mod intensity), normal LV function with mild hypokinsis of basal inferior and basal inferior septum noted, EF 74%, the area of infarction is new finding compared to 2/28/18.  EF 74    In light of risk factors, history of CAD, and abnormal NST pt presents for cardiac catheterization with intervention if clinically indicated.       Cardiac cath @ [facility] 2/21/17: HUA mLAD  Cardaic cath @ [facility] 1/11/17: HUA to mRCA with residual 80% mLAD lesion

## 2020-07-21 ENCOUNTER — APPOINTMENT (OUTPATIENT)
Dept: HEART AND VASCULAR | Facility: CLINIC | Age: 75
End: 2020-07-21
Payer: MEDICARE

## 2020-07-21 PROCEDURE — 99212 OFFICE O/P EST SF 10 MIN: CPT

## 2020-07-21 NOTE — HISTORY OF PRESENT ILLNESS
[FreeTextEntry1] : COVID 19 swab [de-identified] : Pt is scheduled for cardiac cath procedure on 07/24/2020

## 2020-07-22 LAB — SARS-COV-2 N GENE NPH QL NAA+PROBE: NOT DETECTED

## 2020-07-23 ENCOUNTER — OUTPATIENT (OUTPATIENT)
Dept: OUTPATIENT SERVICES | Facility: HOSPITAL | Age: 75
LOS: 1 days | Discharge: HOME | End: 2020-07-23
Payer: MEDICARE

## 2020-07-23 DIAGNOSIS — Z90.89 ACQUIRED ABSENCE OF OTHER ORGANS: Chronic | ICD-10-CM

## 2020-07-23 LAB
A1C WITH ESTIMATED AVERAGE GLUCOSE RESULT: 5.7 % — HIGH (ref 4–5.6)
ALBUMIN SERPL ELPH-MCNC: 4.4 G/DL — SIGNIFICANT CHANGE UP (ref 3.3–5)
ALP SERPL-CCNC: 71 U/L — SIGNIFICANT CHANGE UP (ref 40–120)
ALT FLD-CCNC: 27 U/L — SIGNIFICANT CHANGE UP (ref 10–45)
ANION GAP SERPL CALC-SCNC: 11 MMOL/L — SIGNIFICANT CHANGE UP (ref 5–17)
APTT BLD: 29.4 SEC — SIGNIFICANT CHANGE UP (ref 27.5–35.5)
AST SERPL-CCNC: 37 U/L — SIGNIFICANT CHANGE UP (ref 10–40)
BASOPHILS # BLD AUTO: 0.02 K/UL — SIGNIFICANT CHANGE UP (ref 0–0.2)
BASOPHILS NFR BLD AUTO: 0.3 % — SIGNIFICANT CHANGE UP (ref 0–2)
BILIRUB SERPL-MCNC: 1 MG/DL — SIGNIFICANT CHANGE UP (ref 0.2–1.2)
BUN SERPL-MCNC: 16 MG/DL — SIGNIFICANT CHANGE UP (ref 7–23)
CALCIUM SERPL-MCNC: 9.9 MG/DL — SIGNIFICANT CHANGE UP (ref 8.4–10.5)
CHLORIDE SERPL-SCNC: 99 MMOL/L — SIGNIFICANT CHANGE UP (ref 96–108)
CHOLEST SERPL-MCNC: 123 MG/DL — SIGNIFICANT CHANGE UP (ref 10–199)
CK MB CFR SERPL CALC: 9.7 NG/ML — HIGH (ref 0–6.7)
CK SERPL-CCNC: 467 U/L — HIGH (ref 30–200)
CO2 SERPL-SCNC: 29 MMOL/L — SIGNIFICANT CHANGE UP (ref 22–31)
CREAT SERPL-MCNC: 1.26 MG/DL — SIGNIFICANT CHANGE UP (ref 0.5–1.3)
EOSINOPHIL # BLD AUTO: 0.1 K/UL — SIGNIFICANT CHANGE UP (ref 0–0.5)
EOSINOPHIL NFR BLD AUTO: 1.5 % — SIGNIFICANT CHANGE UP (ref 0–6)
ESTIMATED AVERAGE GLUCOSE: 117 MG/DL — HIGH (ref 68–114)
GLUCOSE SERPL-MCNC: 104 MG/DL — HIGH (ref 70–99)
HCT VFR BLD CALC: 40.8 % — SIGNIFICANT CHANGE UP (ref 39–50)
HDLC SERPL-MCNC: 41 MG/DL — SIGNIFICANT CHANGE UP
HGB BLD-MCNC: 12.9 G/DL — LOW (ref 13–17)
IMM GRANULOCYTES NFR BLD AUTO: 0.2 % — SIGNIFICANT CHANGE UP (ref 0–1.5)
INR BLD: 1.14 — SIGNIFICANT CHANGE UP (ref 0.88–1.16)
LIPID PNL WITH DIRECT LDL SERPL: 68 MG/DL — SIGNIFICANT CHANGE UP
LYMPHOCYTES # BLD AUTO: 1.22 K/UL — SIGNIFICANT CHANGE UP (ref 1–3.3)
LYMPHOCYTES # BLD AUTO: 18.8 % — SIGNIFICANT CHANGE UP (ref 13–44)
MCHC RBC-ENTMCNC: 29.9 PG — SIGNIFICANT CHANGE UP (ref 27–34)
MCHC RBC-ENTMCNC: 31.6 GM/DL — LOW (ref 32–36)
MCV RBC AUTO: 94.4 FL — SIGNIFICANT CHANGE UP (ref 80–100)
MONOCYTES # BLD AUTO: 0.76 K/UL — SIGNIFICANT CHANGE UP (ref 0–0.9)
MONOCYTES NFR BLD AUTO: 11.7 % — SIGNIFICANT CHANGE UP (ref 2–14)
NEUTROPHILS # BLD AUTO: 4.39 K/UL — SIGNIFICANT CHANGE UP (ref 1.8–7.4)
NEUTROPHILS NFR BLD AUTO: 67.5 % — SIGNIFICANT CHANGE UP (ref 43–77)
NRBC # BLD: 0 /100 WBCS — SIGNIFICANT CHANGE UP (ref 0–0)
PLATELET # BLD AUTO: 151 K/UL — SIGNIFICANT CHANGE UP (ref 150–400)
POTASSIUM SERPL-MCNC: 4.1 MMOL/L — SIGNIFICANT CHANGE UP (ref 3.5–5.3)
POTASSIUM SERPL-SCNC: 4.1 MMOL/L — SIGNIFICANT CHANGE UP (ref 3.5–5.3)
PROT SERPL-MCNC: 6.8 G/DL — SIGNIFICANT CHANGE UP (ref 6–8.3)
PROTHROM AB SERPL-ACNC: 13.6 SEC — SIGNIFICANT CHANGE UP (ref 10.6–13.6)
RBC # BLD: 4.32 M/UL — SIGNIFICANT CHANGE UP (ref 4.2–5.8)
RBC # FLD: 12.9 % — SIGNIFICANT CHANGE UP (ref 10.3–14.5)
SODIUM SERPL-SCNC: 139 MMOL/L — SIGNIFICANT CHANGE UP (ref 135–145)
TOTAL CHOLESTEROL/HDL RATIO MEASUREMENT: 3 RATIO — LOW (ref 3.4–9.6)
TRIGL SERPL-MCNC: 69 MG/DL — SIGNIFICANT CHANGE UP (ref 10–149)
WBC # BLD: 6.5 K/UL — SIGNIFICANT CHANGE UP (ref 3.8–10.5)
WBC # FLD AUTO: 6.5 K/UL — SIGNIFICANT CHANGE UP (ref 3.8–10.5)

## 2020-07-23 PROCEDURE — 93458 L HRT ARTERY/VENTRICLE ANGIO: CPT | Mod: 26

## 2020-07-23 RX ORDER — SODIUM CHLORIDE 9 MG/ML
500 INJECTION INTRAMUSCULAR; INTRAVENOUS; SUBCUTANEOUS
Refills: 0 | Status: DISCONTINUED | OUTPATIENT
Start: 2020-07-23 | End: 2020-08-07

## 2020-07-23 RX ORDER — ASPIRIN/CALCIUM CARB/MAGNESIUM 324 MG
81 TABLET ORAL ONCE
Refills: 0 | Status: COMPLETED | OUTPATIENT
Start: 2020-07-23 | End: 2020-07-23

## 2020-07-23 RX ORDER — CLOPIDOGREL BISULFATE 75 MG/1
600 TABLET, FILM COATED ORAL ONCE
Refills: 0 | Status: COMPLETED | OUTPATIENT
Start: 2020-07-23 | End: 2020-07-23

## 2020-07-23 RX ORDER — SODIUM CHLORIDE 9 MG/ML
500 INJECTION INTRAMUSCULAR; INTRAVENOUS; SUBCUTANEOUS
Refills: 0 | Status: DISCONTINUED | OUTPATIENT
Start: 2020-07-23 | End: 2020-07-23

## 2020-07-23 RX ADMIN — CLOPIDOGREL BISULFATE 600 MILLIGRAM(S): 75 TABLET, FILM COATED ORAL at 12:05

## 2020-07-23 RX ADMIN — SODIUM CHLORIDE 75 MILLILITER(S): 9 INJECTION INTRAMUSCULAR; INTRAVENOUS; SUBCUTANEOUS at 12:02

## 2020-07-23 RX ADMIN — Medication 81 MILLIGRAM(S): at 12:05

## 2020-07-23 NOTE — PROGRESS NOTE ADULT - SUBJECTIVE AND OBJECTIVE BOX
Procedure: LHC, Angioseal  Indication: UA, CAD, +EST  Complication: none    Result:  1) Non-obstructive CAD (30% mLAD)  2) Patent stents in LAD & RCA  3) Normal LV function with mild inferobasal wall hypokinesis, EF 55%, LVEDP 6    Plan: Medical management.  D/C home - to f/u with Dr. Teague.

## 2020-07-23 NOTE — PROGRESS NOTE ADULT - SUBJECTIVE AND OBJECTIVE BOX
Interventional Cardiology PA SDA Discharge Note    Patient without complaints.    Afebrile, VSS    Ext:    		Right      Groin:   No    hematoma,   No  bruit, dressing; C/D/I  	    Pulses:    intact DP/PT to baseline     A/P:      73 y/o M with FHx CAD (father CABG at 49yo) and PMHx of HTN, HLD, Non-Hodgkin's lymphoma (dx 1996 and tx'd with chemotherapy, in remission), Transient Global Amnesia (episode in 10/2016; [facility] w/ (-) CT head, EEG, and holter; no recent episodes, CAD (PCI mLAD and mRCA in 2017), who presented to his cardiologist, Dr. Teague, for routine follow up. Pt reports that he is asymptomatic, and denies CP, SOB, orthopnea, PND, N/V/D, dizziness, syncope, diaphoresis, palpitations, fever, chills, LE edema, melena, hematuria. Pt’s cardiologist sent him for a stress test, which was abnormal. NST 7/7/20: abnormal with small sized area of basal inferior and basal inferoseptal wall infarction (mod intensity), normal LV function with mild hypokinsis of basal inferior and basal inferior septum noted, EF 74%, the area of infarction is new finding compared to 2/28/18.  EF 74%. In light of risk factors, history of CAD, and abnormal NST pt presents for cardiac catheterization with intervention if clinically indicated.       s/p diagnostic cath on 7/23/20:           1.	Stable for discharge as per attending Dr. Stanford after bed rest, pt voids, groin stable and 30 minutes of ambulation.  2.	Follow-up with Cardiologalexey Teague in 1-2 weeks  3.	Discharged forms signed and copies in chart Interventional Cardiology PA SDA Discharge Note    Patient without complaints.    Afebrile, VSS    Ext:    		Right      Groin:   No    hematoma,   No  bruit, dressing; C/D/I  	    Pulses:    intact DP/PT to baseline     A/P:      75 y/o M with FHx CAD (father CABG at 51yo) and PMHx of HTN, HLD, Non-Hodgkin's lymphoma (dx 1996 and tx'd with chemotherapy, in remission), Transient Global Amnesia (episode in 10/2016; [facility] w/ (-) CT head, EEG, and holter; no recent episodes, CAD (PCI mLAD and mRCA in 2017), who presented to his cardiologist, Dr. Teague, for routine follow up. Pt reports that he is asymptomatic, and denies CP, SOB, orthopnea, PND, N/V/D, dizziness, syncope, diaphoresis, palpitations, fever, chills, LE edema, melena, hematuria. Pt’s cardiologist sent him for a stress test, which was abnormal. NST 7/7/20: abnormal with small sized area of basal inferior and basal inferoseptal wall infarction (mod intensity), normal LV function with mild hypokinsis of basal inferior and basal inferior septum noted, EF 74%, the area of infarction is new finding compared to 2/28/18.  EF 74%. In light of risk factors, history of CAD, and abnormal NST pt presents for cardiac catheterization with intervention if clinically indicated. s/p diagnostic cath on 7/23/20: non-obstructive CAD (mLAD 30%) with patent stents in LAD/RCA, normal LV function with mild inferobasal wall hypokinesis, LVEF 55%, LVEDP 6, R groin AS.    1.	Stable for discharge as per attending Dr. Stanford after bed rest, pt voids, groin stable and 30 minutes of ambulation.  2.	Follow-up with Cardiologist Ho in 1-2 weeks  3.	Discharged forms signed and copies in chart

## 2020-07-24 PROCEDURE — C1769: CPT

## 2020-07-24 PROCEDURE — C1887: CPT

## 2020-07-24 PROCEDURE — 80053 COMPREHEN METABOLIC PANEL: CPT

## 2020-07-24 PROCEDURE — 82550 ASSAY OF CK (CPK): CPT

## 2020-07-24 PROCEDURE — C1760: CPT

## 2020-07-24 PROCEDURE — 83036 HEMOGLOBIN GLYCOSYLATED A1C: CPT

## 2020-07-24 PROCEDURE — 82553 CREATINE MB FRACTION: CPT

## 2020-07-24 PROCEDURE — 85025 COMPLETE CBC W/AUTO DIFF WBC: CPT

## 2020-07-24 PROCEDURE — 85610 PROTHROMBIN TIME: CPT

## 2020-07-24 PROCEDURE — 85730 THROMBOPLASTIN TIME PARTIAL: CPT

## 2020-07-24 PROCEDURE — 93458 L HRT ARTERY/VENTRICLE ANGIO: CPT

## 2020-07-24 PROCEDURE — C1894: CPT

## 2020-07-24 PROCEDURE — 80061 LIPID PANEL: CPT

## 2020-07-27 PROBLEM — I25.10 ATHEROSCLEROTIC HEART DISEASE OF NATIVE CORONARY ARTERY WITHOUT ANGINA PECTORIS: Chronic | Status: ACTIVE | Noted: 2020-07-20

## 2020-07-29 DIAGNOSIS — I25.110 ATHEROSCLEROTIC HEART DISEASE OF NATIVE CORONARY ARTERY WITH UNSTABLE ANGINA PECTORIS: ICD-10-CM

## 2020-07-29 DIAGNOSIS — R94.39 ABNORMAL RESULT OF OTHER CARDIOVASCULAR FUNCTION STUDY: ICD-10-CM

## 2020-07-29 DIAGNOSIS — Z82.49 FAMILY HISTORY OF ISCHEMIC HEART DISEASE AND OTHER DISEASES OF THE CIRCULATORY SYSTEM: ICD-10-CM

## 2020-07-29 DIAGNOSIS — Z95.5 PRESENCE OF CORONARY ANGIOPLASTY IMPLANT AND GRAFT: ICD-10-CM

## 2020-07-29 DIAGNOSIS — I10 ESSENTIAL (PRIMARY) HYPERTENSION: ICD-10-CM

## 2020-07-29 DIAGNOSIS — E78.5 HYPERLIPIDEMIA, UNSPECIFIED: ICD-10-CM

## 2020-08-13 ENCOUNTER — APPOINTMENT (OUTPATIENT)
Dept: HEART AND VASCULAR | Facility: CLINIC | Age: 75
End: 2020-08-13
Payer: MEDICARE

## 2020-08-13 VITALS
DIASTOLIC BLOOD PRESSURE: 84 MMHG | HEIGHT: 66 IN | WEIGHT: 156 LBS | RESPIRATION RATE: 12 BRPM | BODY MASS INDEX: 25.07 KG/M2 | HEART RATE: 76 BPM | SYSTOLIC BLOOD PRESSURE: 136 MMHG

## 2020-08-13 PROCEDURE — 99213 OFFICE O/P EST LOW 20 MIN: CPT

## 2020-08-13 RX ORDER — CARVEDILOL 3.12 MG/1
3.12 TABLET, FILM COATED ORAL TWICE DAILY
Qty: 60 | Refills: 3 | Status: DISCONTINUED | COMMUNITY
Start: 2020-05-06 | End: 2020-08-13

## 2020-08-13 NOTE — PHYSICAL EXAM
[Normal Appearance] : normal appearance [General Appearance - Well Developed] : well developed [Well Groomed] : well groomed [General Appearance - Well Nourished] : well nourished [No Deformities] : no deformities [Normal Oral Mucosa] : normal oral mucosa [General Appearance - In No Acute Distress] : no acute distress [No Oral Cyanosis] : no oral cyanosis [No Oral Pallor] : no oral pallor [Normal Jugular Venous A Waves Present] : normal jugular venous A waves present [Normal Jugular Venous V Waves Present] : normal jugular venous V waves present [No Jugular Venous Singh A Waves] : no jugular venous singh A waves [Respiration, Rhythm And Depth] : normal respiratory rhythm and effort [Exaggerated Use Of Accessory Muscles For Inspiration] : no accessory muscle use [Auscultation Breath Sounds / Voice Sounds] : lungs were clear to auscultation bilaterally [Heart Sounds] : normal S1 and S2 [Heart Rate And Rhythm] : heart rate and rhythm were normal [Arterial Pulses Normal] : the arterial pulses were normal [Abdomen Soft] : soft [Edema] : no peripheral edema present [Abdomen Tenderness] : non-tender [Abnormal Walk] : normal gait [Abdomen Mass (___ Cm)] : no abdominal mass palpated [Nail Clubbing] : no clubbing of the fingernails [Gait - Sufficient For Exercise Testing] : the gait was sufficient for exercise testing [] : no ischemic changes [Oriented To Time, Place, And Person] : oriented to person, place, and time [Cyanosis, Localized] : no localized cyanosis [Petechial Hemorrhages (___cm)] : no petechial hemorrhages [Affect] : the affect was normal [Mood] : the mood was normal [No Anxiety] : not feeling anxious

## 2020-08-13 NOTE — HISTORY OF PRESENT ILLNESS
[FreeTextEntry1] : 74-year-old male with a past medical history of CAD hypertension comes in for followup. Patient recently underwent a cardiac catheterization due to positive nuclear stress testing. No new ischemic changes and has patent stents.

## 2020-08-13 NOTE — DISCUSSION/SUMMARY
[FreeTextEntry1] : 1. CAD: Continue aspirin 81 mg daily metoprolol 50mg daily\par 2. Hypertension: Well controlled on lisinopril 40 hydrochlorothiazide 12.5 mg daily\par 3. Hyperlipidemia: Continue atorvastatin 40 mg daily.

## 2021-02-11 ENCOUNTER — NON-APPOINTMENT (OUTPATIENT)
Age: 76
End: 2021-02-11

## 2021-02-11 ENCOUNTER — APPOINTMENT (OUTPATIENT)
Dept: HEART AND VASCULAR | Facility: CLINIC | Age: 76
End: 2021-02-11
Payer: MEDICARE

## 2021-02-11 VITALS
WEIGHT: 163 LBS | RESPIRATION RATE: 12 BRPM | HEART RATE: 70 BPM | HEIGHT: 66 IN | BODY MASS INDEX: 26.2 KG/M2 | DIASTOLIC BLOOD PRESSURE: 80 MMHG | SYSTOLIC BLOOD PRESSURE: 130 MMHG

## 2021-02-11 PROCEDURE — 93000 ELECTROCARDIOGRAM COMPLETE: CPT

## 2021-02-11 PROCEDURE — 99214 OFFICE O/P EST MOD 30 MIN: CPT

## 2021-02-11 PROCEDURE — 99072 ADDL SUPL MATRL&STAF TM PHE: CPT

## 2021-02-11 NOTE — HISTORY OF PRESENT ILLNESS
[FreeTextEntry1] : 75-year-old male with a past medical history of CAD comes in for routine followup. Overall, feels well is accompanied by his wife reports patient is well and he denies chest pain shortness of breath PND or orthopnea.

## 2021-02-11 NOTE — DISCUSSION/SUMMARY
[FreeTextEntry1] : 1. CAD: Continue aspirin Toprol. EKG.\par 2. Hypertension: Continue hydrochlorothiazide and lisinopril maintain a low salt diet\par 3. Hyperlipidemia: Continue atorvastatin 40 mg daily maintain a low-fat diet\par \par Patient has gotten his first of 2 Corona virus vaccines.

## 2021-05-10 NOTE — HISTORY OF PRESENT ILLNESS
[FreeTextEntry1] : pt c/o atypical chest pains, R/O CAD.\par \par  Detail Level: Simple Detail Level: Detailed Detail Level: Generalized

## 2021-08-12 ENCOUNTER — APPOINTMENT (OUTPATIENT)
Dept: HEART AND VASCULAR | Facility: CLINIC | Age: 76
End: 2021-08-12
Payer: MEDICARE

## 2021-08-12 ENCOUNTER — NON-APPOINTMENT (OUTPATIENT)
Age: 76
End: 2021-08-12

## 2021-08-12 VITALS
RESPIRATION RATE: 12 BRPM | HEIGHT: 66 IN | DIASTOLIC BLOOD PRESSURE: 76 MMHG | BODY MASS INDEX: 25.71 KG/M2 | SYSTOLIC BLOOD PRESSURE: 130 MMHG | WEIGHT: 160 LBS | HEART RATE: 68 BPM

## 2021-08-12 DIAGNOSIS — I25.10 ATHEROSCLEROTIC HEART DISEASE OF NATIVE CORONARY ARTERY W/OUT ANGINA PECTORIS: ICD-10-CM

## 2021-08-12 PROCEDURE — 93000 ELECTROCARDIOGRAM COMPLETE: CPT

## 2021-08-12 PROCEDURE — 93306 TTE W/DOPPLER COMPLETE: CPT

## 2021-08-12 PROCEDURE — ZZZZZ: CPT

## 2021-08-12 PROCEDURE — 99214 OFFICE O/P EST MOD 30 MIN: CPT | Mod: 25

## 2021-08-12 PROCEDURE — 93880 EXTRACRANIAL BILAT STUDY: CPT

## 2021-08-12 RX ORDER — TAMSULOSIN HYDROCHLORIDE 0.4 MG/1
0.4 CAPSULE ORAL
Refills: 0 | Status: ACTIVE | COMMUNITY

## 2021-08-12 NOTE — HISTORY OF PRESENT ILLNESS
[FreeTextEntry1] : 75-year-old male with a past medical history of CAD hypertension hyperlipidemia comes in for routine followup. Overall, feels well denies chest pain shortness of breath PND or orthopnea.

## 2021-08-12 NOTE — DISCUSSION/SUMMARY
[FreeTextEntry1] : 1. CAD: Continue aspirin and Toprol. EKG.\par 2. Carotid atherosclerosis: Carotid duplex to assess for progression of disease\par 3. Hypertension: Well controlled on lisinopril maintain a low-salt diet\par 4. Hyperlipidemia: Continue atorvastatin\par 5. Cardiac murmur: Echocardiogram

## 2022-08-11 ENCOUNTER — NON-APPOINTMENT (OUTPATIENT)
Age: 77
End: 2022-08-11

## 2022-08-11 ENCOUNTER — APPOINTMENT (OUTPATIENT)
Dept: HEART AND VASCULAR | Facility: CLINIC | Age: 77
End: 2022-08-11

## 2022-08-11 VITALS
WEIGHT: 156 LBS | DIASTOLIC BLOOD PRESSURE: 80 MMHG | HEIGHT: 66 IN | RESPIRATION RATE: 12 BRPM | BODY MASS INDEX: 25.07 KG/M2 | SYSTOLIC BLOOD PRESSURE: 150 MMHG | HEART RATE: 70 BPM

## 2022-08-11 DIAGNOSIS — I65.29 OCCLUSION AND STENOSIS OF UNSPECIFIED CAROTID ARTERY: ICD-10-CM

## 2022-08-11 PROCEDURE — 93000 ELECTROCARDIOGRAM COMPLETE: CPT

## 2022-08-11 PROCEDURE — 93306 TTE W/DOPPLER COMPLETE: CPT

## 2022-08-11 PROCEDURE — 93880 EXTRACRANIAL BILAT STUDY: CPT

## 2022-08-11 PROCEDURE — 99214 OFFICE O/P EST MOD 30 MIN: CPT

## 2022-08-11 PROCEDURE — 99214 OFFICE O/P EST MOD 30 MIN: CPT | Mod: 25

## 2022-08-11 RX ORDER — HYDROCHLOROTHIAZIDE 12.5 MG/1
12.5 CAPSULE ORAL
Qty: 90 | Refills: 0 | Status: DISCONTINUED | COMMUNITY
Start: 2022-06-07

## 2022-08-11 NOTE — HISTORY OF PRESENT ILLNESS
[FreeTextEntry1] : 76-year-old male with a past medical history CAD comes in for routine followup. Overall, he feels well denies chest pain shortness of breath PND or orthopnea.

## 2022-08-11 NOTE — PHYSICAL EXAM

## 2022-08-11 NOTE — DISCUSSION/SUMMARY
[FreeTextEntry1] : 1. CAD: Continue aspirin Toprol, EKG and exercise nuclear stress test. Will advise with results are known. Echo\par 2. Hypertension: Patient follows regularly with PCP reports lower pressure in his office so for now continue lisinopril and hydrochlorothiazide as currently dosed and maintain a low-salt diet\par 3. Hyperlipidemia: Continue atorvastatin followup fasting lipid panel\par 4. Carotid atherosclerosis: Carotid duplex

## 2022-08-23 ENCOUNTER — APPOINTMENT (OUTPATIENT)
Dept: HEART AND VASCULAR | Facility: CLINIC | Age: 77
End: 2022-08-23

## 2022-08-23 VITALS
HEIGHT: 66 IN | DIASTOLIC BLOOD PRESSURE: 70 MMHG | WEIGHT: 156 LBS | BODY MASS INDEX: 25.07 KG/M2 | HEART RATE: 85 BPM | SYSTOLIC BLOOD PRESSURE: 150 MMHG

## 2022-08-23 PROCEDURE — 93015 CV STRESS TEST SUPVJ I&R: CPT

## 2022-08-31 ENCOUNTER — APPOINTMENT (OUTPATIENT)
Dept: HEART AND VASCULAR | Facility: CLINIC | Age: 77
End: 2022-08-31

## 2022-10-26 ENCOUNTER — APPOINTMENT (OUTPATIENT)
Dept: HEART AND VASCULAR | Facility: CLINIC | Age: 77
End: 2022-10-26

## 2022-10-26 DIAGNOSIS — R07.9 CHEST PAIN, UNSPECIFIED: ICD-10-CM

## 2022-10-26 PROCEDURE — 96374 THER/PROPH/DIAG INJ IV PUSH: CPT | Mod: 59

## 2022-10-26 PROCEDURE — 93015 CV STRESS TEST SUPVJ I&R: CPT

## 2022-10-26 PROCEDURE — 78452 HT MUSCLE IMAGE SPECT MULT: CPT

## 2022-10-26 PROCEDURE — A9500: CPT

## 2022-11-04 VITALS
SYSTOLIC BLOOD PRESSURE: 158 MMHG | HEART RATE: 75 BPM | WEIGHT: 160.06 LBS | DIASTOLIC BLOOD PRESSURE: 74 MMHG | OXYGEN SATURATION: 97 % | HEIGHT: 69 IN | RESPIRATION RATE: 16 BRPM | TEMPERATURE: 98 F

## 2022-11-04 NOTE — H&P ADULT - ASSESSMENT
78 y/o male w/ FHx CAD (father w/ CABG in 50's) and PMHx HTN, HLD, CAD (s/p PCI mid LAD and mid RCA in 2017, last cath 6/2020 diagnostic), and episode of Transient Global Amnesia (in 2016), and non-Hodgkins Lymphoma (s/p chemotherapy, in remission) who presented to outpatient cardiologist, Dr. Teague, for routine follow up. In light of patient's risk factors, abnormal Myocardial Perfusion results, and despite being asymptomatic, patient now presents for cardiac catheterization w/ possible intervention if clinically indicated     -H/H stable, no signs of active bleeding. Last dose ASA 81 mg x1 and plavix 600 mg x1   -EF 60%, euvolemic, pre cath fluids with  CC bolus x 1    Risks & benefits of procedure and alternative therapy have been explained to the patient including but not limited to: allergic reaction, bleeding w/possible need for blood transfusion, infection, renal and vascular compromise, limb damage, arrhythmia, stroke, vessel dissection/perforation, Myocardial infarction, emergent CABG. Informed consent obtained and in chart.       Suitable for moderate sedation.

## 2022-11-04 NOTE — H&P ADULT - HISTORY OF PRESENT ILLNESS
COVID: ______  Cardiologist: Dr. Teague   Pharmacy: _______  Escort: ________    76 y/o male w/ FHx CAD (father w/ CABG in 50's) and PMHx HTN, HLD, CAD (s/p PCI mid LAD and mid RCA in 2017), and episode of Transient Global Amnesia (in 2016), and non-Hodgkins Lymphoma (s/p chemotherapy, in remission) who presented to outpatient cardiologist, Dr. Teague, for routine follow up. Patient at that time denied any dizziness, diaphoresis, syncope, chest pain, palpitations, abdominal pain, nausea/vomiting, melena, LE edema, fever, chills, and cough. Exercise Stress Test (08/23/2022) showed rate related LBBB but normal exercise tolerance and undiagnostic EKG response to treadmill exercise. Myocardial Perfusion (10/26/2022) revealed moderate sized, reversible defect of moderate intensity of the mid inferior wall and infero-septal segment, and EF 66%.      In light of patient's risk factors, abnormal Myocardial Perfusion results, and despite being asymptomatic, patient now presents for cardiac catheterization w/ possible intervention if clinically indictaed.    COVID: 11/4/22 NEG (HIE)  Cardiologist: Dr. Teague   Pharmacy: [facility] [id number] [phone]  Escort: Wife     78 y/o male w/ FHx CAD (father w/ CABG in 50's) and PMHx HTN, HLD, CAD (s/p PCI mid LAD and mid RCA in 2017, last cath 6/2020 diagnostic), and episode of Transient Global Amnesia (in 2016), and non-Hodgkins Lymphoma (s/p chemotherapy, in remission) who presented to outpatient cardiologist, Dr. Teague, for routine follow up. Patient at that time denied any dizziness, diaphoresis, syncope, chest pain, palpitations, abdominal pain, nausea/vomiting, melena, LE edema, fever, chills, and cough. Exercise Stress Test (08/23/2022) showed rate related LBBB but normal exercise tolerance and undiagnostic EKG response to treadmill exercise. Myocardial Perfusion (10/26/2022) revealed moderate sized, reversible defect of moderate intensity of the mid inferior wall and infero-septal segment, and EF 66%.      In light of patient's risk factors, abnormal Myocardial Perfusion results, and despite being asymptomatic, patient now presents for cardiac catheterization w/ possible intervention if clinically indicated     Cardiac cath 6/23/20@ [facility]: Non-obstructive CAD (30% mLAD), Patent stents in LAD & RCA, Normal LV function with mild inferobasal wall hypokinesis, EF 55%, LVEDP 6

## 2022-11-04 NOTE — H&P ADULT - RESPIRATORY
clear to auscultation bilaterally/no rales/no rhonchi/no respiratory distress/no use of accessory muscles

## 2022-11-07 ENCOUNTER — OUTPATIENT (OUTPATIENT)
Dept: OUTPATIENT SERVICES | Facility: HOSPITAL | Age: 77
LOS: 1 days | Discharge: HOME | End: 2022-11-07
Payer: MEDICARE

## 2022-11-07 DIAGNOSIS — Z90.89 ACQUIRED ABSENCE OF OTHER ORGANS: Chronic | ICD-10-CM

## 2022-11-07 LAB
ALBUMIN SERPL ELPH-MCNC: 4.2 G/DL — SIGNIFICANT CHANGE UP (ref 3.3–5)
ALP SERPL-CCNC: 77 U/L — SIGNIFICANT CHANGE UP (ref 40–120)
ALT FLD-CCNC: 21 U/L — SIGNIFICANT CHANGE UP (ref 10–45)
ANION GAP SERPL CALC-SCNC: 8 MMOL/L — SIGNIFICANT CHANGE UP (ref 5–17)
APTT BLD: 28.7 SEC — SIGNIFICANT CHANGE UP (ref 27.5–35.5)
AST SERPL-CCNC: 35 U/L — SIGNIFICANT CHANGE UP (ref 10–40)
BASOPHILS # BLD AUTO: 0.04 K/UL — SIGNIFICANT CHANGE UP (ref 0–0.2)
BASOPHILS NFR BLD AUTO: 0.8 % — SIGNIFICANT CHANGE UP (ref 0–2)
BILIRUB SERPL-MCNC: 0.6 MG/DL — SIGNIFICANT CHANGE UP (ref 0.2–1.2)
BUN SERPL-MCNC: 15 MG/DL — SIGNIFICANT CHANGE UP (ref 7–23)
CALCIUM SERPL-MCNC: 9.4 MG/DL — SIGNIFICANT CHANGE UP (ref 8.4–10.5)
CHLORIDE SERPL-SCNC: 104 MMOL/L — SIGNIFICANT CHANGE UP (ref 96–108)
CHOLEST SERPL-MCNC: 106 MG/DL — SIGNIFICANT CHANGE UP
CK MB CFR SERPL CALC: 9.3 NG/ML — HIGH (ref 0–6.7)
CK SERPL-CCNC: 370 U/L — HIGH (ref 30–200)
CO2 SERPL-SCNC: 29 MMOL/L — SIGNIFICANT CHANGE UP (ref 22–31)
CREAT SERPL-MCNC: 1.02 MG/DL — SIGNIFICANT CHANGE UP (ref 0.5–1.3)
EGFR: 75 ML/MIN/1.73M2 — SIGNIFICANT CHANGE UP
EOSINOPHIL # BLD AUTO: 0.12 K/UL — SIGNIFICANT CHANGE UP (ref 0–0.5)
EOSINOPHIL NFR BLD AUTO: 2.3 % — SIGNIFICANT CHANGE UP (ref 0–6)
GLUCOSE SERPL-MCNC: 111 MG/DL — HIGH (ref 70–99)
HCT VFR BLD CALC: 36.4 % — LOW (ref 39–50)
HDLC SERPL-MCNC: 42 MG/DL — SIGNIFICANT CHANGE UP
HGB BLD-MCNC: 12 G/DL — LOW (ref 13–17)
IMM GRANULOCYTES NFR BLD AUTO: 0.2 % — SIGNIFICANT CHANGE UP (ref 0–0.9)
INR BLD: 1.12 — SIGNIFICANT CHANGE UP (ref 0.88–1.16)
LIPID PNL WITH DIRECT LDL SERPL: 47 MG/DL — SIGNIFICANT CHANGE UP
LYMPHOCYTES # BLD AUTO: 1.15 K/UL — SIGNIFICANT CHANGE UP (ref 1–3.3)
LYMPHOCYTES # BLD AUTO: 22.2 % — SIGNIFICANT CHANGE UP (ref 13–44)
MCHC RBC-ENTMCNC: 31 PG — SIGNIFICANT CHANGE UP (ref 27–34)
MCHC RBC-ENTMCNC: 33 GM/DL — SIGNIFICANT CHANGE UP (ref 32–36)
MCV RBC AUTO: 94.1 FL — SIGNIFICANT CHANGE UP (ref 80–100)
MONOCYTES # BLD AUTO: 0.58 K/UL — SIGNIFICANT CHANGE UP (ref 0–0.9)
MONOCYTES NFR BLD AUTO: 11.2 % — SIGNIFICANT CHANGE UP (ref 2–14)
NEUTROPHILS # BLD AUTO: 3.28 K/UL — SIGNIFICANT CHANGE UP (ref 1.8–7.4)
NEUTROPHILS NFR BLD AUTO: 63.3 % — SIGNIFICANT CHANGE UP (ref 43–77)
NON HDL CHOLESTEROL: 64 MG/DL — SIGNIFICANT CHANGE UP
NRBC # BLD: 0 /100 WBCS — SIGNIFICANT CHANGE UP (ref 0–0)
PLATELET # BLD AUTO: 151 K/UL — SIGNIFICANT CHANGE UP (ref 150–400)
POTASSIUM SERPL-MCNC: 3.8 MMOL/L — SIGNIFICANT CHANGE UP (ref 3.5–5.3)
POTASSIUM SERPL-SCNC: 3.8 MMOL/L — SIGNIFICANT CHANGE UP (ref 3.5–5.3)
PROT SERPL-MCNC: 6.7 G/DL — SIGNIFICANT CHANGE UP (ref 6–8.3)
PROTHROM AB SERPL-ACNC: 13.3 SEC — SIGNIFICANT CHANGE UP (ref 10.5–13.4)
RBC # BLD: 3.87 M/UL — LOW (ref 4.2–5.8)
RBC # FLD: 13 % — SIGNIFICANT CHANGE UP (ref 10.3–14.5)
SODIUM SERPL-SCNC: 141 MMOL/L — SIGNIFICANT CHANGE UP (ref 135–145)
TRIGL SERPL-MCNC: 85 MG/DL — SIGNIFICANT CHANGE UP
WBC # BLD: 5.18 K/UL — SIGNIFICANT CHANGE UP (ref 3.8–10.5)
WBC # FLD AUTO: 5.18 K/UL — SIGNIFICANT CHANGE UP (ref 3.8–10.5)

## 2022-11-07 PROCEDURE — 93571 IV DOP VEL&/PRESS C FLO 1ST: CPT | Mod: 26,52,LD

## 2022-11-07 PROCEDURE — 82550 ASSAY OF CK (CPK): CPT

## 2022-11-07 PROCEDURE — C1760: CPT

## 2022-11-07 PROCEDURE — 82553 CREATINE MB FRACTION: CPT

## 2022-11-07 PROCEDURE — 36415 COLL VENOUS BLD VENIPUNCTURE: CPT

## 2022-11-07 PROCEDURE — 85025 COMPLETE CBC W/AUTO DIFF WBC: CPT

## 2022-11-07 PROCEDURE — 93571 IV DOP VEL&/PRESS C FLO 1ST: CPT | Mod: LD,52

## 2022-11-07 PROCEDURE — 99152 MOD SED SAME PHYS/QHP 5/>YRS: CPT

## 2022-11-07 PROCEDURE — 93458 L HRT ARTERY/VENTRICLE ANGIO: CPT

## 2022-11-07 PROCEDURE — 80053 COMPREHEN METABOLIC PANEL: CPT

## 2022-11-07 PROCEDURE — C1887: CPT

## 2022-11-07 PROCEDURE — 85610 PROTHROMBIN TIME: CPT

## 2022-11-07 PROCEDURE — 85730 THROMBOPLASTIN TIME PARTIAL: CPT

## 2022-11-07 PROCEDURE — 93005 ELECTROCARDIOGRAM TRACING: CPT

## 2022-11-07 PROCEDURE — C1769: CPT

## 2022-11-07 PROCEDURE — 93010 ELECTROCARDIOGRAM REPORT: CPT

## 2022-11-07 PROCEDURE — 80061 LIPID PANEL: CPT

## 2022-11-07 PROCEDURE — 93458 L HRT ARTERY/VENTRICLE ANGIO: CPT | Mod: 26

## 2022-11-07 PROCEDURE — C1894: CPT

## 2022-11-07 RX ORDER — TAMSULOSIN HYDROCHLORIDE 0.4 MG/1
1 CAPSULE ORAL
Qty: 0 | Refills: 0 | DISCHARGE

## 2022-11-07 RX ORDER — CHLORHEXIDINE GLUCONATE 213 G/1000ML
1 SOLUTION TOPICAL ONCE
Refills: 0 | Status: DISCONTINUED | OUTPATIENT
Start: 2022-11-07 | End: 2022-11-21

## 2022-11-07 RX ORDER — ASPIRIN/CALCIUM CARB/MAGNESIUM 324 MG
81 TABLET ORAL ONCE
Refills: 0 | Status: COMPLETED | OUTPATIENT
Start: 2022-11-07 | End: 2022-11-07

## 2022-11-07 RX ORDER — SODIUM CHLORIDE 9 MG/ML
500 INJECTION INTRAMUSCULAR; INTRAVENOUS; SUBCUTANEOUS
Refills: 0 | Status: DISCONTINUED | OUTPATIENT
Start: 2022-11-07 | End: 2022-11-21

## 2022-11-07 RX ORDER — CLOPIDOGREL BISULFATE 75 MG/1
600 TABLET, FILM COATED ORAL ONCE
Refills: 0 | Status: COMPLETED | OUTPATIENT
Start: 2022-11-07 | End: 2022-11-07

## 2022-11-07 RX ORDER — SODIUM CHLORIDE 9 MG/ML
500 INJECTION INTRAMUSCULAR; INTRAVENOUS; SUBCUTANEOUS
Refills: 0 | Status: DISCONTINUED | OUTPATIENT
Start: 2022-11-07 | End: 2022-11-07

## 2022-11-07 RX ORDER — SODIUM CHLORIDE 9 MG/ML
250 INJECTION INTRAMUSCULAR; INTRAVENOUS; SUBCUTANEOUS ONCE
Refills: 0 | Status: COMPLETED | OUTPATIENT
Start: 2022-11-07 | End: 2022-11-07

## 2022-11-07 RX ADMIN — SODIUM CHLORIDE 220 MILLILITER(S): 9 INJECTION INTRAMUSCULAR; INTRAVENOUS; SUBCUTANEOUS at 16:01

## 2022-11-07 RX ADMIN — Medication 81 MILLIGRAM(S): at 12:49

## 2022-11-07 RX ADMIN — SODIUM CHLORIDE 75 MILLILITER(S): 9 INJECTION INTRAMUSCULAR; INTRAVENOUS; SUBCUTANEOUS at 12:49

## 2022-11-07 RX ADMIN — CLOPIDOGREL BISULFATE 600 MILLIGRAM(S): 75 TABLET, FILM COATED ORAL at 12:49

## 2022-11-07 RX ADMIN — SODIUM CHLORIDE 500 MILLILITER(S): 9 INJECTION INTRAMUSCULAR; INTRAVENOUS; SUBCUTANEOUS at 12:50

## 2022-11-07 NOTE — PROGRESS NOTE ADULT - SUBJECTIVE AND OBJECTIVE BOX
Interventional Cardiology PA SDA Discharge Note    Patient without complaints. Ambulated and voided without difficulties    Afebrile, VSS    Ext:    		Right           Groin: no      hematoma, no    bruit, dressing; C/D/I  		           Pulses:    intact DP/PT to baseline     A/P:  76 y/o male w/ FHx CAD (father w/ CABG in 50's) and PMHx HTN, HLD, CAD (s/p PCI mid LAD and mid RCA in 2017, last cath 6/2020 diagnostic), and episode of Transient Global Amnesia (in 2016), and non-Hodgkins Lymphoma (s/p chemotherapy, in remission) who presented to outpatient cardiologist, Dr. Teague, for routine follow up. In light of patient's risk factors, abnormal Myocardial Perfusion results, and despite being asymptomatic, patient now presents for cardiac catheterization w/ possible intervention if clinically indicated.    s/p cardiac catheterization 11/7/22: non obstructive CAD with patent prior RCA and LAD stents, EDP nl, RFA PC.       1.	Stable for discharge as per attending Dr. Oconnor after bed rest, pt voids, groin/wrist stable and 30 minutes of ambulation.  2.	Follow-up with PMD/Cardiologist Dr. Teague in 1-2 weeks  3.	Discharged forms signed and copies in chart

## 2022-11-09 PROBLEM — R07.9 CHEST PAIN, UNSPECIFIED: Status: ACTIVE | Noted: 2018-06-04

## 2022-11-10 DIAGNOSIS — I25.110 ATHEROSCLEROTIC HEART DISEASE OF NATIVE CORONARY ARTERY WITH UNSTABLE ANGINA PECTORIS: ICD-10-CM

## 2022-11-10 DIAGNOSIS — Z95.5 PRESENCE OF CORONARY ANGIOPLASTY IMPLANT AND GRAFT: ICD-10-CM

## 2022-11-10 DIAGNOSIS — R94.39 ABNORMAL RESULT OF OTHER CARDIOVASCULAR FUNCTION STUDY: ICD-10-CM

## 2022-11-29 ENCOUNTER — NON-APPOINTMENT (OUTPATIENT)
Age: 77
End: 2022-11-29

## 2022-11-29 ENCOUNTER — APPOINTMENT (OUTPATIENT)
Dept: HEART AND VASCULAR | Facility: CLINIC | Age: 77
End: 2022-11-29

## 2022-11-29 VITALS
SYSTOLIC BLOOD PRESSURE: 170 MMHG | RESPIRATION RATE: 12 BRPM | DIASTOLIC BLOOD PRESSURE: 90 MMHG | HEART RATE: 80 BPM | HEIGHT: 66 IN | BODY MASS INDEX: 24.11 KG/M2 | WEIGHT: 150 LBS

## 2022-11-29 PROCEDURE — 99214 OFFICE O/P EST MOD 30 MIN: CPT | Mod: 25

## 2022-11-29 PROCEDURE — 93000 ELECTROCARDIOGRAM COMPLETE: CPT

## 2022-11-29 RX ORDER — LISINOPRIL 40 MG/1
40 TABLET ORAL DAILY
Refills: 0 | Status: DISCONTINUED | COMMUNITY
End: 2022-11-29

## 2022-11-29 RX ORDER — HYDROCHLOROTHIAZIDE 12.5 MG/1
12.5 TABLET ORAL
Refills: 0 | Status: DISCONTINUED | COMMUNITY
End: 2022-11-29

## 2022-11-29 NOTE — DISCUSSION/SUMMARY
[FreeTextEntry1] : 1.  CAD: EKG; continue current meds.\par 2.  Hypertension: Patient did complain to his PCP about excessive urination on lisinopril/hydrochlorothiazide.  Patient discontinued both and started losartan 50 mg daily.  He reports at home blood pressure is typically in the 130s to 140/80-90 range.  Patient to follow-up with PCP who made the changes in the next 2 weeks.  Advised for now to maintain a low-salt diet and continue to monitor pressure at home.\par 3.  Hyperlipidemia: Continue statin. [EKG obtained to assist in diagnosis and management of assessed problem(s)] : EKG obtained to assist in diagnosis and management of assessed problem(s)

## 2022-11-29 NOTE — HISTORY OF PRESENT ILLNESS
[FreeTextEntry1] : 77-year-old male with past medical history of CAD hypertension hyperlipidemia comes in for follow-up.  Since last visit patient did undergo coronary angiogram which revealed patent stents with no new significant lesions.  Overall, feels well denies chest pain shortness of breath PND orthopnea.

## 2023-06-20 ENCOUNTER — APPOINTMENT (OUTPATIENT)
Dept: HEART AND VASCULAR | Facility: CLINIC | Age: 78
End: 2023-06-20
Payer: MEDICARE

## 2023-06-20 VITALS
BODY MASS INDEX: 24.75 KG/M2 | WEIGHT: 154 LBS | RESPIRATION RATE: 12 BRPM | HEIGHT: 66 IN | SYSTOLIC BLOOD PRESSURE: 170 MMHG | DIASTOLIC BLOOD PRESSURE: 80 MMHG | HEART RATE: 84 BPM

## 2023-06-20 PROCEDURE — 93000 ELECTROCARDIOGRAM COMPLETE: CPT

## 2023-06-20 PROCEDURE — 99214 OFFICE O/P EST MOD 30 MIN: CPT | Mod: 25

## 2023-06-20 RX ORDER — BUTALBITAL, ACETAMINOPHEN AND CAFFEINE 325; 50; 40 MG/1; MG/1; MG/1
50-325-40 TABLET ORAL
Qty: 40 | Refills: 0 | Status: DISCONTINUED | COMMUNITY
Start: 2022-05-02 | End: 2023-06-20

## 2023-06-20 NOTE — HISTORY OF PRESENT ILLNESS
[FreeTextEntry1] : 77-year-old male with past medical history of CAD status post PCI hypertension comes in for routine follow-up.  Overall, feels well denies chest pain shortness of breath PND orthopnea.

## 2023-06-20 NOTE — DISCUSSION/SUMMARY
[FreeTextEntry1] : 1.  CAD: Continue aspirin and metoprolol.  EKG.\par 2.  Hypertension: Patient recently had an adjustment in his blood pressure medication by PCP losartan raised to 100 mg daily.  On home monitoring and PCPs office blood pressure typically within normal limits.  We will leave it to his PCP to manage hypertension.\par 3.  Hyperlipidemia: Continue statin. [EKG obtained to assist in diagnosis and management of assessed problem(s)] : EKG obtained to assist in diagnosis and management of assessed problem(s)

## 2023-12-21 NOTE — PATIENT PROFILE ADULT. - TEACHING/LEARNING FACTORS INFLUENCE READINESS TO LEARN
Problem: Discharge Planning  Goal: Discharge to home or other facility with appropriate resources  Outcome: Progressing     Problem: ABCDS Injury Assessment  Goal: Absence of physical injury  Outcome: Completed     Problem: Safety - Adult  Goal: Free from fall injury  Outcome: Completed     Problem: Pain  Goal: Verbalizes/displays adequate comfort level or baseline comfort level  Outcome: Completed none

## 2024-01-17 ENCOUNTER — APPOINTMENT (OUTPATIENT)
Age: 79
End: 2024-01-17
Payer: MEDICARE

## 2024-01-17 ENCOUNTER — NON-APPOINTMENT (OUTPATIENT)
Age: 79
End: 2024-01-17

## 2024-01-17 VITALS
WEIGHT: 153 LBS | DIASTOLIC BLOOD PRESSURE: 80 MMHG | SYSTOLIC BLOOD PRESSURE: 150 MMHG | RESPIRATION RATE: 12 BRPM | HEART RATE: 80 BPM | HEIGHT: 66 IN | BODY MASS INDEX: 24.59 KG/M2

## 2024-01-17 DIAGNOSIS — R01.1 CARDIAC MURMUR, UNSPECIFIED: ICD-10-CM

## 2024-01-17 DIAGNOSIS — R09.89 OTHER SPECIFIED SYMPTOMS AND SIGNS INVOLVING THE CIRCULATORY AND RESPIRATORY SYSTEMS: ICD-10-CM

## 2024-01-17 PROCEDURE — 99214 OFFICE O/P EST MOD 30 MIN: CPT | Mod: 25

## 2024-01-17 PROCEDURE — 93306 TTE W/DOPPLER COMPLETE: CPT

## 2024-01-17 PROCEDURE — 93000 ELECTROCARDIOGRAM COMPLETE: CPT

## 2024-01-17 PROCEDURE — 93880 EXTRACRANIAL BILAT STUDY: CPT

## 2024-01-17 RX ORDER — ALPRAZOLAM 0.25 MG/1
0.25 TABLET ORAL
Refills: 0 | Status: ACTIVE | COMMUNITY

## 2024-01-17 NOTE — DISCUSSION/SUMMARY
[FreeTextEntry1] : 1.  CAD: Continue current medication.  EKG. 2.  Cardiac murmur: Echocardiogram 3.  Carotid bruit: Carotid duplex 4.  Hypertension: Managed by his PCP blood pressure typically they are within normal limits for now advised to continue current medication as prescribed by his PCP. 5.  Hyperlipidemia: Continue atorvastatin [EKG obtained to assist in diagnosis and management of assessed problem(s)] : EKG obtained to assist in diagnosis and management of assessed problem(s)

## 2024-01-17 NOTE — HISTORY OF PRESENT ILLNESS
[FreeTextEntry1] : 78-year-old male with past medical history of CAD hypertension hyperlipidemia comes in accompanied by his wife for routine follow-up.  Overall, feels well denies any chest pain shortness of breath PND orthopnea.

## 2024-06-10 ENCOUNTER — APPOINTMENT (OUTPATIENT)
Dept: HEART AND VASCULAR | Facility: CLINIC | Age: 79
End: 2024-06-10
Payer: MEDICARE

## 2024-06-10 VITALS
HEIGHT: 66 IN | WEIGHT: 147 LBS | HEART RATE: 45 BPM | RESPIRATION RATE: 12 BRPM | SYSTOLIC BLOOD PRESSURE: 140 MMHG | BODY MASS INDEX: 23.63 KG/M2 | DIASTOLIC BLOOD PRESSURE: 80 MMHG

## 2024-06-10 VITALS — DIASTOLIC BLOOD PRESSURE: 84 MMHG | SYSTOLIC BLOOD PRESSURE: 160 MMHG

## 2024-06-10 DIAGNOSIS — E78.5 HYPERLIPIDEMIA, UNSPECIFIED: ICD-10-CM

## 2024-06-10 PROCEDURE — 93000 ELECTROCARDIOGRAM COMPLETE: CPT

## 2024-06-10 PROCEDURE — G2211 COMPLEX E/M VISIT ADD ON: CPT

## 2024-06-10 PROCEDURE — 99214 OFFICE O/P EST MOD 30 MIN: CPT

## 2024-06-10 RX ORDER — LOSARTAN POTASSIUM AND HYDROCHLOROTHIAZIDE 12.5; 1 MG/1; MG/1
100-12.5 TABLET ORAL
Refills: 0 | Status: ACTIVE | COMMUNITY

## 2024-06-10 RX ORDER — LOSARTAN POTASSIUM 100 MG/1
100 TABLET, FILM COATED ORAL DAILY
Qty: 90 | Refills: 3 | Status: DISCONTINUED | COMMUNITY
Start: 2022-11-15 | End: 2024-06-10

## 2024-06-10 NOTE — DISCUSSION/SUMMARY
[FreeTextEntry1] : 1.  CAD: EKG reviewed sinus bradycardia.  Will decrease metoprolol to 25 mg daily.  Continue aspirin. 2.  Hypertension: Orthostatics negative continue losartan hydrochlorothiazide as currently dosed advised to follow-up with primary care for routine blood work 3.  Hyperlipidemia: Continue atorvastatin follow-up fasting lipid panel 4.  Lightheadedness: Advised to follow-up with PCP to discuss returning to 1 tablet of Flomax daily and using alternate agent in conjunction for BPH symptoms and possible urology referral. [EKG obtained to assist in diagnosis and management of assessed problem(s)] : EKG obtained to assist in diagnosis and management of assessed problem(s)

## 2024-06-10 NOTE — HISTORY OF PRESENT ILLNESS
[FreeTextEntry1] : 78-year-old male with past medical history of CAD hypertension hyperlipidemia non-Hodgkin's lymphoma, global transient amnesia comes in accompanied by his wife for follow-up.  Since last visit patient has been having orthostatic lightheadedness especially when climbing stairs.  Has been taking double dose of Flomax due to urinary symptoms.  Has had an adjustment in his blood pressure medication.  Denies any chest pain shortness of breath PND orthopnea.

## 2024-06-10 NOTE — PHYSICAL EXAM

## 2024-06-24 ENCOUNTER — APPOINTMENT (OUTPATIENT)
Dept: HEART AND VASCULAR | Facility: CLINIC | Age: 79
End: 2024-06-24

## 2024-06-24 ENCOUNTER — APPOINTMENT (OUTPATIENT)
Dept: HEART AND VASCULAR | Facility: CLINIC | Age: 79
End: 2024-06-24
Payer: MEDICARE

## 2024-06-24 ENCOUNTER — NON-APPOINTMENT (OUTPATIENT)
Age: 79
End: 2024-06-24

## 2024-06-24 VITALS
DIASTOLIC BLOOD PRESSURE: 70 MMHG | BODY MASS INDEX: 23.63 KG/M2 | HEIGHT: 66 IN | SYSTOLIC BLOOD PRESSURE: 140 MMHG | HEART RATE: 51 BPM | RESPIRATION RATE: 12 BRPM | WEIGHT: 147 LBS

## 2024-06-24 DIAGNOSIS — I25.10 ATHEROSCLEROTIC HEART DISEASE OF NATIVE CORONARY ARTERY W/OUT ANGINA PECTORIS: ICD-10-CM

## 2024-06-24 DIAGNOSIS — I10 ESSENTIAL (PRIMARY) HYPERTENSION: ICD-10-CM

## 2024-06-24 PROCEDURE — 93242 EXT ECG>48HR<7D RECORDING: CPT

## 2024-06-24 PROCEDURE — 93000 ELECTROCARDIOGRAM COMPLETE: CPT | Mod: 59

## 2024-06-24 PROCEDURE — G2211 COMPLEX E/M VISIT ADD ON: CPT

## 2024-06-24 PROCEDURE — 99214 OFFICE O/P EST MOD 30 MIN: CPT

## 2024-06-24 RX ORDER — METOPROLOL SUCCINATE 25 MG/1
25 TABLET, EXTENDED RELEASE ORAL
Qty: 90 | Refills: 3 | Status: DISCONTINUED | COMMUNITY
Start: 1900-01-01 | End: 2024-06-24

## 2024-06-24 RX ORDER — METOPROLOL TARTRATE 25 MG/1
25 TABLET, FILM COATED ORAL
Qty: 15 | Refills: 3 | Status: ACTIVE | COMMUNITY

## 2024-07-09 PROCEDURE — 93244 EXT ECG>48HR<7D REV&INTERPJ: CPT

## 2024-07-10 ENCOUNTER — APPOINTMENT (OUTPATIENT)
Dept: HEART AND VASCULAR | Facility: CLINIC | Age: 79
End: 2024-07-10

## 2024-08-21 ENCOUNTER — TRANSCRIPTION ENCOUNTER (OUTPATIENT)
Age: 79
End: 2024-08-21

## 2024-08-21 ENCOUNTER — APPOINTMENT (OUTPATIENT)
Dept: HEART AND VASCULAR | Facility: CLINIC | Age: 79
End: 2024-08-21

## 2024-08-21 ENCOUNTER — NON-APPOINTMENT (OUTPATIENT)
Age: 79
End: 2024-08-21

## 2024-08-21 VITALS
DIASTOLIC BLOOD PRESSURE: 65 MMHG | HEART RATE: 79 BPM | HEIGHT: 66 IN | BODY MASS INDEX: 23.63 KG/M2 | WEIGHT: 147 LBS | SYSTOLIC BLOOD PRESSURE: 197 MMHG

## 2024-08-21 PROCEDURE — 99214 OFFICE O/P EST MOD 30 MIN: CPT | Mod: 25

## 2024-08-21 PROCEDURE — 93000 ELECTROCARDIOGRAM COMPLETE: CPT

## 2024-08-21 NOTE — PHYSICAL EXAM

## 2024-08-21 NOTE — HISTORY OF PRESENT ILLNESS
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    The patient comes in accompanied by his wife. He was given a monitor because he noted lightheadedness with climbing stairs. He notes good energy levels, states that he does a lot of physical activities.

## 2024-08-21 NOTE — DISCUSSION/SUMMARY
[FreeTextEntry1] : 1.  CAD: Continue current medication, will place a 3-day Holter monitor to assess for arrhythmias as patient's heart rate is labile between 40 and 60.  Prefer to rule out heart block.  Patient reports recent blood work all within normal limits.  Advised to copy results for review. 2.  Hypertension: For now continue the same medication.

## 2024-09-11 ENCOUNTER — APPOINTMENT (OUTPATIENT)
Dept: HEART AND VASCULAR | Facility: CLINIC | Age: 79
End: 2024-09-11
Payer: MEDICARE

## 2024-09-11 ENCOUNTER — NON-APPOINTMENT (OUTPATIENT)
Age: 79
End: 2024-09-11

## 2024-09-11 VITALS
BODY MASS INDEX: 23.63 KG/M2 | SYSTOLIC BLOOD PRESSURE: 130 MMHG | HEIGHT: 66 IN | WEIGHT: 147 LBS | DIASTOLIC BLOOD PRESSURE: 80 MMHG | RESPIRATION RATE: 12 BRPM | HEART RATE: 57 BPM

## 2024-09-11 DIAGNOSIS — I10 ESSENTIAL (PRIMARY) HYPERTENSION: ICD-10-CM

## 2024-09-11 DIAGNOSIS — I25.10 ATHEROSCLEROTIC HEART DISEASE OF NATIVE CORONARY ARTERY W/OUT ANGINA PECTORIS: ICD-10-CM

## 2024-09-11 DIAGNOSIS — E78.5 HYPERLIPIDEMIA, UNSPECIFIED: ICD-10-CM

## 2024-09-11 DIAGNOSIS — I44.30 UNSPECIFIED ATRIOVENTRICULAR BLOCK: ICD-10-CM

## 2024-09-11 PROCEDURE — 99214 OFFICE O/P EST MOD 30 MIN: CPT

## 2024-09-11 PROCEDURE — G2211 COMPLEX E/M VISIT ADD ON: CPT

## 2024-09-11 PROCEDURE — 93000 ELECTROCARDIOGRAM COMPLETE: CPT

## 2024-09-11 NOTE — DISCUSSION/SUMMARY
[FreeTextEntry1] : 1.  CAD: EKG; continue current medication.  Will hold off on AV roseanne blockers as patient with resting bradycardia. 2.  Hypertension: Well-controlled on current medication advised to continue 3.  Hyperlipidemia: Continue statin 4.  AV block: EP note appreciated no need for permanent pacemaker at this time. [EKG obtained to assist in diagnosis and management of assessed problem(s)] : EKG obtained to assist in diagnosis and management of assessed problem(s)

## 2024-09-11 NOTE — PHYSICAL EXAM

## 2024-09-11 NOTE — HISTORY OF PRESENT ILLNESS
[FreeTextEntry1] : 78-year-old male with past medical history of hypertension hyperlipidemia comes in for follow-up.  Since last visit patient did confirm with electrophysiology for 2-1 AV block.  Patient at this point remains asymptomatic now that he is off beta-blocker.  No need for permanent pacemaker at this time.  Overall, he feels well denies any chest pain shortness of breath PND orthopnea palpitations lightheadedness dizziness or syncope.

## 2025-03-17 ENCOUNTER — APPOINTMENT (OUTPATIENT)
Dept: HEART AND VASCULAR | Facility: CLINIC | Age: 80
End: 2025-03-17

## 2025-03-17 ENCOUNTER — NON-APPOINTMENT (OUTPATIENT)
Age: 80
End: 2025-03-17

## 2025-03-17 ENCOUNTER — APPOINTMENT (OUTPATIENT)
Dept: HEART AND VASCULAR | Facility: CLINIC | Age: 80
End: 2025-03-17
Payer: MEDICARE

## 2025-03-17 VITALS
BODY MASS INDEX: 22.5 KG/M2 | SYSTOLIC BLOOD PRESSURE: 146 MMHG | DIASTOLIC BLOOD PRESSURE: 90 MMHG | WEIGHT: 140 LBS | HEIGHT: 66 IN | RESPIRATION RATE: 12 BRPM | HEART RATE: 60 BPM

## 2025-03-17 DIAGNOSIS — I10 ESSENTIAL (PRIMARY) HYPERTENSION: ICD-10-CM

## 2025-03-17 DIAGNOSIS — E78.5 HYPERLIPIDEMIA, UNSPECIFIED: ICD-10-CM

## 2025-03-17 DIAGNOSIS — I25.10 ATHEROSCLEROTIC HEART DISEASE OF NATIVE CORONARY ARTERY W/OUT ANGINA PECTORIS: ICD-10-CM

## 2025-03-17 DIAGNOSIS — I65.29 OCCLUSION AND STENOSIS OF UNSPECIFIED CAROTID ARTERY: ICD-10-CM

## 2025-03-17 PROCEDURE — 93000 ELECTROCARDIOGRAM COMPLETE: CPT

## 2025-03-17 PROCEDURE — 99214 OFFICE O/P EST MOD 30 MIN: CPT | Mod: 25

## 2025-03-17 PROCEDURE — 93306 TTE W/DOPPLER COMPLETE: CPT

## 2025-03-17 PROCEDURE — 93880 EXTRACRANIAL BILAT STUDY: CPT
